# Patient Record
Sex: FEMALE | Race: BLACK OR AFRICAN AMERICAN | Employment: FULL TIME | URBAN - METROPOLITAN AREA
[De-identification: names, ages, dates, MRNs, and addresses within clinical notes are randomized per-mention and may not be internally consistent; named-entity substitution may affect disease eponyms.]

---

## 2020-11-13 ENCOUNTER — HOSPITAL ENCOUNTER (EMERGENCY)
Age: 25
Discharge: HOME | End: 2020-11-13
Attending: EMERGENCY MEDICINE

## 2020-11-13 ENCOUNTER — APPOINTMENT (OUTPATIENT)
Dept: GENERAL RADIOLOGY | Age: 25
End: 2020-11-13
Attending: EMERGENCY MEDICINE

## 2020-11-13 VITALS
SYSTOLIC BLOOD PRESSURE: 130 MMHG | RESPIRATION RATE: 18 BRPM | WEIGHT: 293 LBS | BODY MASS INDEX: 53.92 KG/M2 | TEMPERATURE: 98.1 F | HEART RATE: 70 BPM | HEIGHT: 62 IN | DIASTOLIC BLOOD PRESSURE: 60 MMHG | OXYGEN SATURATION: 100 %

## 2020-11-13 DIAGNOSIS — S83.8X1A ACUTE LATERAL MENISCAL INJURY OF RIGHT KNEE, INITIAL ENCOUNTER: Primary | ICD-10-CM

## 2020-11-13 PROCEDURE — 73562 X-RAY EXAM OF KNEE 3: CPT

## 2020-11-13 PROCEDURE — 99283 EMERGENCY DEPT VISIT LOW MDM: CPT

## 2020-11-14 NOTE — DISCHARGE INSTRUCTIONS
Patient Education      Tylenol and Motrin for pain. You may alternate them every 3-4 hours for best results. Knee for 20 minutes every 4 hours while awake for 3 to 5 days. Call [facility] Monday morning for follow-up and recheck later this week. Return if warmth or fever. Learning About RICE (Rest, Ice, Compression, and Elevation)  What is RICE? RICE is a way to care for an injury. RICE helps relieve pain and swelling. It may also help with healing and flexibility. RICE stands for:  · R est and protect the injured or sore area. · I ce or a cold pack used as soon as possible. · C ompression, or wrapping the injured or sore area with an elastic bandage. · E levation (propping up) the injured or sore area. How do you do RICE? You can use RICE for home treatment when you have general aches and pains or after an injury or surgery. Rest  · Do not put weight on the injury for at least 24 to 48 hours. · Use crutches for a badly sprained knee or ankle. · Support a sprained wrist, elbow, or shoulder with a sling. Ice  · Put ice or a cold pack on the injury right away to reduce pain and swelling. Frozen vegetables will also work as an ice pack. Put a thin cloth between the ice or cold pack and your skin. The cloth protects the injured area from getting too cold. · Use ice for 10 to 15 minutes at a time for the first 48 to 72 hours. Compression  · Use compression for sprains, strains, and surgeries of the arms and legs. · Wrap the injured area with an elastic bandage or compression sleeve to reduce swelling. · Don't wrap it too tightly. If the area below it feels numb, tingles, or feels cool, loosen the wrap. Elevation  · Use elevation for areas of the body that can be propped up, such as arms and legs. · Prop up the injured area on pillows whenever you use ice. Keep it propped up anytime you sit or lie down. · Try to keep the injured area at or above the level of your heart.  This will help reduce swelling and bruising. Where can you learn more? Go to http://www.gray.com/  Enter I463 in the search box to learn more about \"Learning About RICE (Rest, Ice, Compression, and Elevation). \"  Current as of: March 2, 2020               Content Version: 12.6  © 8428-1587 [employer]. Care instructions adapted under license by [employer] (which disclaims liability or warranty for this information). If you have questions about a medical condition or this instruction, always ask your healthcare professional. [address] any warranty or liability for your use of this information.

## 2020-11-14 NOTE — ED NOTES
I have reviewed discharge instructions with the patient. The patient verbalized understanding. Patient left ED via Discharge Method: ambulatory to Home with self. Opportunity for questions and clarification provided. Patient given 0 scripts. The pt was in no acute distress at [place]. To continue your aftercare when you leave the hospital, you may receive an automated call from our care team to check in on how you are doing. This is a free service and part of our promise to provide the best care and service to meet your aftercare needs.  If you have questions, or wish to unsubscribe from this service please call [phone]. Thank you for Choosing our [address] [place] [address] Emergency Department.

## 2020-11-14 NOTE — ED TRIAGE NOTES
Pt states that she is having right knee pain. She states that it is difficult to walk on it. This has been going on for \"awhile. \"     Pt masked in triage.

## 2021-01-25 ENCOUNTER — HOSPITAL ENCOUNTER (EMERGENCY)
Age: 26
Discharge: HOME | End: 2021-01-25
Attending: EMERGENCY MEDICINE

## 2021-01-25 VITALS
RESPIRATION RATE: 20 BRPM | WEIGHT: 293 LBS | TEMPERATURE: 98 F | DIASTOLIC BLOOD PRESSURE: 68 MMHG | OXYGEN SATURATION: 100 % | SYSTOLIC BLOOD PRESSURE: 108 MMHG | HEART RATE: 68 BPM | HEIGHT: 64 IN | BODY MASS INDEX: 50.02 KG/M2

## 2021-01-25 DIAGNOSIS — D64.9 ANEMIA, UNSPECIFIED TYPE: ICD-10-CM

## 2021-01-25 DIAGNOSIS — R42 DIZZY: Primary | ICD-10-CM

## 2021-01-25 LAB
ALBUMIN SERPL-MCNC: 3.5 G/DL (ref 3.5–5)
ALBUMIN/GLOB SERPL: 0.8 {RATIO} (ref 1.2–3.5)
ALP SERPL-CCNC: 99 U/L (ref 50–136)
ALT SERPL-CCNC: 19 U/L (ref 12–65)
ANION GAP SERPL CALC-SCNC: 3 MMOL/L (ref 7–16)
AST SERPL-CCNC: 12 U/L (ref 15–37)
ATRIAL RATE: 49 BPM
BASOPHILS # BLD: 0 K/UL (ref 0–0.2)
BASOPHILS NFR BLD: 1 % (ref 0–2)
BILIRUB SERPL-MCNC: 0.3 MG/DL (ref 0.2–1.1)
BUN SERPL-MCNC: 9 MG/DL (ref 6–23)
CALCIUM SERPL-MCNC: 8.9 MG/DL (ref 8.3–10.4)
CALCULATED P AXIS, ECG09: 62 DEGREES
CALCULATED R AXIS, ECG10: 72 DEGREES
CALCULATED T AXIS, ECG11: 41 DEGREES
CHLORIDE SERPL-SCNC: 108 MMOL/L (ref 98–107)
CO2 SERPL-SCNC: 28 MMOL/L (ref 21–32)
CREAT SERPL-MCNC: 0.76 MG/DL (ref 0.6–1)
DIAGNOSIS, 93000: NORMAL
DIFFERENTIAL METHOD BLD: ABNORMAL
EOSINOPHIL # BLD: 0.1 K/UL (ref 0–0.8)
EOSINOPHIL NFR BLD: 2 % (ref 0.5–7.8)
ERYTHROCYTE [DISTWIDTH] IN BLOOD BY AUTOMATED COUNT: 20.4 % (ref 11.9–14.6)
GLOBULIN SER CALC-MCNC: 4.5 G/DL (ref 2.3–3.5)
GLUCOSE SERPL-MCNC: 86 MG/DL (ref 65–100)
HCG UR QL: NEGATIVE
HCT VFR BLD AUTO: 33.2 % (ref 35.8–46.3)
HGB BLD-MCNC: 9.1 G/DL (ref 11.7–15.4)
IMM GRANULOCYTES # BLD AUTO: 0 K/UL (ref 0–0.5)
IMM GRANULOCYTES NFR BLD AUTO: 0 % (ref 0–5)
LYMPHOCYTES # BLD: 2.3 K/UL (ref 0.5–4.6)
LYMPHOCYTES NFR BLD: 42 % (ref 13–44)
MCH RBC QN AUTO: 16.5 PG (ref 26.1–32.9)
MCHC RBC AUTO-ENTMCNC: 27.4 G/DL (ref 31.4–35)
MCV RBC AUTO: 60.3 FL (ref 79.6–97.8)
MONOCYTES # BLD: 0.5 K/UL (ref 0.1–1.3)
MONOCYTES NFR BLD: 9 % (ref 4–12)
NEUTS SEG # BLD: 2.5 K/UL (ref 1.7–8.2)
NEUTS SEG NFR BLD: 46 % (ref 43–78)
NRBC # BLD: 0 K/UL (ref 0–0.2)
P-R INTERVAL, ECG05: 170 MS
PLATELET # BLD AUTO: 465 K/UL (ref 150–450)
PMV BLD AUTO: 9.6 FL (ref 9.4–12.3)
POTASSIUM SERPL-SCNC: 4.1 MMOL/L (ref 3.5–5.1)
PROT SERPL-MCNC: 8 G/DL (ref 6.3–8.2)
Q-T INTERVAL, ECG07: 436 MS
QRS DURATION, ECG06: 98 MS
QTC CALCULATION (BEZET), ECG08: 393 MS
RBC # BLD AUTO: 5.51 M/UL (ref 4.05–5.2)
SODIUM SERPL-SCNC: 139 MMOL/L (ref 136–145)
VENTRICULAR RATE, ECG03: 49 BPM
WBC # BLD AUTO: 5.5 K/UL (ref 4.3–11.1)

## 2021-01-25 PROCEDURE — 81003 URINALYSIS AUTO W/O SCOPE: CPT

## 2021-01-25 PROCEDURE — 93005 ELECTROCARDIOGRAM TRACING: CPT | Performed by: PHYSICIAN ASSISTANT

## 2021-01-25 PROCEDURE — 85025 COMPLETE CBC W/AUTO DIFF WBC: CPT

## 2021-01-25 PROCEDURE — 80053 COMPREHEN METABOLIC PANEL: CPT

## 2021-01-25 PROCEDURE — 81025 URINE PREGNANCY TEST: CPT

## 2021-01-25 PROCEDURE — 99285 EMERGENCY DEPT VISIT HI MDM: CPT

## 2021-01-25 NOTE — LETTER
[id number] [address] EMERGENCY DEPT 
[address] [facility] [id number] [phone] Work/School Note Date: 1/25/2021 To Whom It May concern: Orly Chatterjee was seen and treated today in the emergency room by the following provider(s): 
Attending Provider: Sánchez Phillip MD 
Physician Assistant: AUGUSTA Juarez. Orly Chatterjee is excused from work/school on 01/25/21. She is clear to return to work/school on 01/26/21. Sincerely, AUGUSTA Alcantara

## 2021-01-25 NOTE — DISCHARGE INSTRUCTIONS
Use over the counter daily multivitamin with iron, call office to arrange follow up appt, return to er if symptoms worsen

## 2021-01-25 NOTE — ED NOTES
I have reviewed discharge instructions with the patient. The patient verbalized understanding. Patient left ED via Discharge Method: ambulatory to Home with (self). Opportunity for questions and clarification provided. Patient given 0 scripts. No esign         To continue your aftercare when you leave the hospital, you may receive an automated call from our care team to check in on how you are doing. This is a free service and part of our promise to provide the best care and service to meet your aftercare needs.  If you have questions, or wish to unsubscribe from this service please call [phone]. Thank you for Choosing our [facility] Emergency Department.

## 2021-01-25 NOTE — ED TRIAGE NOTES
Pt states dizziness since yesterday. Denies N/V/D. States she did have some ETOH on Sat. The day before she got dizzy. States she was at work today and got dizzy again and could not stay. Pt has a mask for triage.

## 2021-02-26 ENCOUNTER — HOSPITAL ENCOUNTER (EMERGENCY)
Age: 26
Discharge: HOME | End: 2021-02-26
Attending: STUDENT IN AN ORGANIZED HEALTH CARE EDUCATION/TRAINING PROGRAM

## 2021-02-26 VITALS
RESPIRATION RATE: 16 BRPM | TEMPERATURE: 99.2 F | WEIGHT: 293 LBS | DIASTOLIC BLOOD PRESSURE: 70 MMHG | HEART RATE: 76 BPM | BODY MASS INDEX: 53.92 KG/M2 | SYSTOLIC BLOOD PRESSURE: 105 MMHG | HEIGHT: 62 IN | OXYGEN SATURATION: 100 %

## 2021-02-26 DIAGNOSIS — B37.31 VAGINAL CANDIDA: Primary | ICD-10-CM

## 2021-02-26 DIAGNOSIS — B96.89 BV (BACTERIAL VAGINOSIS): ICD-10-CM

## 2021-02-26 DIAGNOSIS — N76.0 BV (BACTERIAL VAGINOSIS): ICD-10-CM

## 2021-02-26 LAB
SERVICE CMNT-IMP: NORMAL
WET PREP GENITAL: NORMAL

## 2021-02-26 PROCEDURE — 87491 CHLMYD TRACH DNA AMP PROBE: CPT

## 2021-02-26 PROCEDURE — 87210 SMEAR WET MOUNT SALINE/INK: CPT

## 2021-02-26 PROCEDURE — 99283 EMERGENCY DEPT VISIT LOW MDM: CPT

## 2021-02-26 PROCEDURE — 81003 URINALYSIS AUTO W/O SCOPE: CPT

## 2021-02-26 PROCEDURE — 74011250637 HC RX REV CODE- 250/637: Performed by: STUDENT IN AN ORGANIZED HEALTH CARE EDUCATION/TRAINING PROGRAM

## 2021-02-26 RX ORDER — METRONIDAZOLE 500 MG/1
500 TABLET ORAL 2 TIMES DAILY
Qty: 14 TAB | Refills: 0 | Status: SHIPPED | OUTPATIENT
Start: 2021-02-26 | End: 2021-03-05

## 2021-02-26 RX ORDER — FLUCONAZOLE 100 MG/1
200 TABLET ORAL
Status: COMPLETED | OUTPATIENT
Start: 2021-02-26 | End: 2021-02-26

## 2021-02-26 RX ADMIN — FLUCONAZOLE 200 MG: 100 TABLET ORAL at 21:47

## 2021-02-26 NOTE — ED TRIAGE NOTES
Patient advises that she believes that she has a yeast infection after changing laundry detergents. States white discharge and severe pain when she wipes area. Mask on during triage.

## 2021-02-27 NOTE — DISCHARGE INSTRUCTIONS
Take the antibiotic prescribed as directed and to run out of medication. Avoid tight fitting clothing and change undergarments regularly.

## 2021-02-27 NOTE — ED PROVIDER NOTES
25-year-old female patient presents with reports of vaginal irritation and discharge yesterday. Patient states she recently changed detergents and feels this may have caused her issue. She reports similar symptoms in the past with yeast infection stating that her current symptoms feel similar. She reports white discharge and external vaginal pain though no rash exists. She denies any known STD exposure but is currently sexually active. Last menstrual cycle was completed proximally 1 week ago. Patient denies fever, chills or pain elsewhere. The history is provided by the patient. No  was used. Vaginal Discharge   Pertinent negatives include no diaphoresis, no fever, no abdominal pain, no diarrhea, no nausea, no vomiting and no dysuria. History reviewed. No pertinent past medical history. History reviewed. No pertinent surgical history. History reviewed. No pertinent family history.     Social History     Socioeconomic History    Marital status:      Spouse name: Not on file    Number of children: Not on file    Years of education: Not on file    Highest education level: Not on file   Occupational History    Not on file   Social Needs    Financial resource strain: Not on file    Food insecurity     Worry: Not on file     Inability: Not on file    Transportation needs     Medical: Not on file     Non-medical: Not on file   Tobacco Use    Smoking status: Never Smoker    Smokeless tobacco: Never Used   Substance and Sexual Activity    Alcohol use: Yes     Comment: socially    Drug use: Not Currently    Sexual activity: Not on file   Lifestyle    Physical activity     Days per week: Not on file     Minutes per session: Not on file    Stress: Not on file   Relationships    Social connections     Talks on phone: Not on file     Gets together: Not on file     Attends Jew service: Not on file     Active member of club or organization: Not on file Attends meetings of clubs or organizations: Not on file     Relationship status: Not on file    Intimate partner violence     Fear of current or ex partner: Not on file     Emotionally abused: Not on file     Physically abused: Not on file     Forced sexual activity: Not on file   Other Topics Concern    Not on file   Social History Narrative    Not on file         ALLERGIES: Patient has no known allergies. Review of Systems   Constitutional: Negative for chills, diaphoresis and fever. HENT: Negative for congestion, sneezing and sore throat. Eyes: Negative for visual disturbance. Respiratory: Negative for cough, chest tightness, shortness of breath and wheezing. Cardiovascular: Negative for chest pain and leg swelling. Gastrointestinal: Negative for abdominal pain, blood in stool, diarrhea, nausea and vomiting. Endocrine: Negative for polyuria. Genitourinary: Positive for vaginal discharge and vaginal pain. Negative for difficulty urinating, dysuria, flank pain, hematuria and urgency. Musculoskeletal: Negative for back pain, myalgias, neck pain and neck stiffness. Skin: Negative for color change and rash. Neurological: Negative for dizziness, syncope, speech difficulty, weakness, light-headedness, numbness and headaches. Psychiatric/Behavioral: Negative for behavioral problems. All other systems reviewed and are negative. Vitals:    02/26/21 1815   BP: 105/70   Pulse: 76   Resp: 16   Temp: 99.2 °F (37.3 °C)   SpO2: 100%   Weight: 132.9 kg (293 lb)   Height: 5' 2\" (1.575 m)            Physical Exam  Vitals signs and nursing note reviewed. Constitutional:       General: She is not in acute distress. Appearance: She is well-developed. She is not diaphoretic. Comments: Alert and oriented to person place and time. No acute distress, speaks in clear, fluid sentences. HENT:      Head: Normocephalic and atraumatic.       Right Ear: External ear normal.      Left Ear: External ear normal.      Nose: Nose normal.   Eyes:      Pupils: Pupils are equal, round, and reactive to light. Neck:      Musculoskeletal: Normal range of motion. Cardiovascular:      Rate and Rhythm: Normal rate and regular rhythm. Heart sounds: Normal heart sounds. No murmur. No friction rub. No gallop. Pulmonary:      Effort: Pulmonary effort is normal. No respiratory distress. Breath sounds: Normal breath sounds. No stridor. No decreased breath sounds, wheezing, rhonchi or rales. Chest:      Chest wall: No tenderness. Abdominal:      General: There is no distension. Palpations: Abdomen is soft. There is no mass. Tenderness: There is no abdominal tenderness. There is no guarding or rebound. Hernia: No hernia is present. Musculoskeletal: Normal range of motion. General: No tenderness or deformity. Skin:     General: Skin is warm and dry. Neurological:      Mental Status: She is alert and oriented to person, place, and time. Cranial Nerves: No cranial nerve deficit. MDM  Number of Diagnoses or Management Options  BV (bacterial vaginosis): new and does not require workup  Vaginal candida: new and does not require workup  Diagnosis management comments: Pelvic examination and culture collection, patient wishes to self swab. Swabs reveal evidence of yeast and clue cells. Discussed these findings with patient who voices understanding and agreement. Patient received Diflucan in this department will be discharged with Flagyl. Voice dictation software was used during the making of this note. This software is not perfect and grammatical and other typographical errors may be present. This note has been proofread, but may still contain errors.   [address] Doctor Scotty De La Rosa [facility] DO; 2/27/2021 @12:49 AM   ===================================================================         Amount and/or Complexity of Data Reviewed  Clinical lab tests: ordered and reviewed  Tests in the medicine section of CPT®: ordered and reviewed    Risk of Complications, Morbidity, and/or Mortality  Presenting problems: moderate  Diagnostic procedures: low  Management options: moderate    Patient Progress  Patient progress: stable         Procedures

## 2021-03-01 LAB
C TRACH RRNA SPEC QL NAA+PROBE: NEGATIVE
N GONORRHOEA RRNA SPEC QL NAA+PROBE: NEGATIVE
PLEASE NOTE:, 188601: NORMAL
SPECIMEN SOURCE: NORMAL

## 2021-03-31 NOTE — ED PROVIDER NOTES
03/31/21 0741   Vital Signs   Temp 97 °F (36.1 °C)   Temp Source Oral   Pulse 64   Heart Rate Source Monitor   Resp 18   BP (!) 143/71   BP Location Left upper arm   Patient Position Semi fowlers   Level of Consciousness Alert (0)   MEWS Score 1   Oxygen Therapy   SpO2 97 %   O2 Device None (Room air)     Vitals as above this am. Patient in stable condition, however she states she is having nausea related to her antibiotics. Patient medicated with zofran at this time per prn order. Patient did not take her PO medications this am due to nausea, she wished to hold off until nausea improves. Patient has call light within reach and bed alarm on. Patient complains of off-and-on dizziness that started yesterday. She states she does not feel dizzy while she sitting only when she is walking or stands. No chest pain or shortness of breath she did try to go to work this morning felt an episode but was able to leave work and drive herself here patient denies any history of diabetes or thyroid illness no headache no visual changes. She states she had some alcohol on Saturday but only a small amount does not feel that this contributes to her symptoms    The history is provided by the patient. Dizziness  This is a new problem. The current episode started yesterday. The problem has not changed since onset. There was no focality noted. Pertinent negatives include no focal weakness, no loss of sensation, no loss of balance, no slurred speech, no speech difficulty, no memory loss, no movement disorder, no agitation, no visual change, no auditory change, no mental status change, no unresponsiveness and no disorientation. There has been no fever. Pertinent negatives include no shortness of breath, no chest pain, no vomiting, no altered mental status, no confusion, no headaches, no choking, no nausea, no bowel incontinence and no bladder incontinence. History reviewed. No pertinent past medical history. History reviewed. No pertinent surgical history. History reviewed. No pertinent family history.     Social History     Socioeconomic History    Marital status:      Spouse name: Not on file    Number of children: Not on file    Years of education: Not on file    Highest education level: Not on file   Occupational History    Not on file   Social Needs    Financial resource strain: Not on file    Food insecurity     Worry: Not on file     Inability: Not on file    Transportation needs     Medical: Not on file     Non-medical: Not on file   Tobacco Use    Smoking status: Never Smoker    Smokeless tobacco: Never Used   Substance and Sexual Activity    Alcohol use: Yes     Comment: socially    Drug use: Not Currently    Sexual activity: Not on file   Lifestyle    Physical activity     Days per week: Not on file     Minutes per session: Not on file    Stress: Not on file   Relationships    Social connections     Talks on phone: Not on file     Gets together: Not on file     Attends Taoism service: Not on file     Active member of club or organization: Not on file     Attends meetings of clubs or organizations: Not on file     Relationship status: Not on file    Intimate partner violence     Fear of current or ex partner: Not on file     Emotionally abused: Not on file     Physically abused: Not on file     Forced sexual activity: Not on file   Other Topics Concern    Not on file   Social History Narrative    Not on file         ALLERGIES: Patient has no known allergies. Review of Systems   Respiratory: Negative for choking and shortness of breath. Cardiovascular: Negative for chest pain. Gastrointestinal: Negative for bowel incontinence, nausea and vomiting. Genitourinary: Negative for bladder incontinence. Neurological: Positive for dizziness. Negative for focal weakness, speech difficulty, headaches and loss of balance. Psychiatric/Behavioral: Negative for agitation, confusion and memory loss. All other systems reviewed and are negative. Vitals:    01/25/21 0852   BP: 120/66   Pulse: (!) 58   Resp: 16   Temp: 98 °F (36.7 °C)   SpO2: 100%   Weight: 151.5 kg (334 lb)   Height: 5' 4\" (1.626 m)            Physical Exam  Vitals signs and nursing note reviewed. Constitutional:       General: She is not in acute distress. Appearance: Normal appearance. She is well-developed. She is obese. She is not diaphoretic. HENT:      Head: Normocephalic and atraumatic. Eyes:      Extraocular Movements: Extraocular movements intact. Pupils: Pupils are equal, round, and reactive to light.    Neck:      Musculoskeletal: Normal range of motion and neck supple. No muscular tenderness. Cardiovascular:      Rate and Rhythm: Normal rate and regular rhythm. Pulmonary:      Effort: Pulmonary effort is normal.      Breath sounds: Normal breath sounds. No wheezing or rhonchi. Abdominal:      General: Bowel sounds are normal.      Palpations: Abdomen is soft. Musculoskeletal: Normal range of motion. Skin:     General: Skin is warm. Neurological:      General: No focal deficit present. Mental Status: She is alert and oriented to person, place, and time.    Psychiatric:         Mood and Affect: Mood normal.         Behavior: Behavior normal.          MDM  Number of Diagnoses or Management Options  Anemia, unspecified type  Dizzy  Diagnosis management comments: Cbc, cmp, normal, ekg sinus elida 49, pt not orthostatic, urine hcg and dip -  On review of previous chart heart rate in 50's, hcg 9.2, was 9.5 2 years ago  Stressed need for pmd,no further er work up work note given        Amount and/or Complexity of Data Reviewed  Clinical lab tests: ordered and reviewed  Review and summarize past medical records: yes  Discuss the patient with other providers: yes  Independent visualization of images, tracings, or specimens: yes    Risk of Complications, Morbidity, and/or Mortality  Presenting problems: moderate  Diagnostic procedures: low  Management options: low    Patient Progress  Patient progress: improved         Procedures

## 2021-05-20 ENCOUNTER — APPOINTMENT (OUTPATIENT)
Dept: ULTRASOUND IMAGING | Age: 26
End: 2021-05-20
Attending: PHYSICIAN ASSISTANT

## 2021-05-20 ENCOUNTER — HOSPITAL ENCOUNTER (EMERGENCY)
Age: 26
Discharge: HOME | End: 2021-05-20
Attending: EMERGENCY MEDICINE

## 2021-05-20 VITALS
SYSTOLIC BLOOD PRESSURE: 151 MMHG | BODY MASS INDEX: 54.14 KG/M2 | DIASTOLIC BLOOD PRESSURE: 79 MMHG | OXYGEN SATURATION: 100 % | WEIGHT: 293 LBS | HEART RATE: 67 BPM | RESPIRATION RATE: 16 BRPM | TEMPERATURE: 98.1 F

## 2021-05-20 DIAGNOSIS — R10.11 ABDOMINAL PAIN, RIGHT UPPER QUADRANT: Primary | ICD-10-CM

## 2021-05-20 LAB
ALBUMIN SERPL-MCNC: 3.4 G/DL (ref 3.5–5)
ALBUMIN/GLOB SERPL: 0.7 {RATIO} (ref 1.2–3.5)
ALP SERPL-CCNC: 89 U/L (ref 50–130)
ALT SERPL-CCNC: 21 U/L (ref 12–65)
ANION GAP SERPL CALC-SCNC: 4 MMOL/L (ref 7–16)
AST SERPL-CCNC: 16 U/L (ref 15–37)
BASOPHILS # BLD: 0 K/UL (ref 0–0.2)
BASOPHILS NFR BLD: 1 % (ref 0–2)
BILIRUB SERPL-MCNC: 0.3 MG/DL (ref 0.2–1.1)
BUN SERPL-MCNC: 15 MG/DL (ref 6–23)
CALCIUM SERPL-MCNC: 8.5 MG/DL (ref 8.3–10.4)
CHLORIDE SERPL-SCNC: 109 MMOL/L (ref 98–107)
CO2 SERPL-SCNC: 26 MMOL/L (ref 21–32)
CREAT SERPL-MCNC: 0.72 MG/DL (ref 0.6–1)
DIFFERENTIAL METHOD BLD: ABNORMAL
EOSINOPHIL # BLD: 0.1 K/UL (ref 0–0.8)
EOSINOPHIL NFR BLD: 1 % (ref 0.5–7.8)
ERYTHROCYTE [DISTWIDTH] IN BLOOD BY AUTOMATED COUNT: 18.8 % (ref 11.9–14.6)
GLOBULIN SER CALC-MCNC: 4.6 G/DL (ref 2.3–3.5)
GLUCOSE SERPL-MCNC: 89 MG/DL (ref 65–100)
HCT VFR BLD AUTO: 27.8 % (ref 35.8–46.3)
HGB BLD-MCNC: 7.8 G/DL (ref 11.7–15.4)
IMM GRANULOCYTES # BLD AUTO: 0 K/UL (ref 0–0.5)
IMM GRANULOCYTES NFR BLD AUTO: 0 % (ref 0–5)
LIPASE SERPL-CCNC: 43 U/L (ref 73–393)
LYMPHOCYTES # BLD: 2 K/UL (ref 0.5–4.6)
LYMPHOCYTES NFR BLD: 32 % (ref 13–44)
MCH RBC QN AUTO: 16.4 PG (ref 26.1–32.9)
MCHC RBC AUTO-ENTMCNC: 28.1 G/DL (ref 31.4–35)
MCV RBC AUTO: 58.3 FL (ref 79.6–97.8)
MONOCYTES # BLD: 0.7 K/UL (ref 0.1–1.3)
MONOCYTES NFR BLD: 11 % (ref 4–12)
NEUTS SEG # BLD: 3.4 K/UL (ref 1.7–8.2)
NEUTS SEG NFR BLD: 54 % (ref 43–78)
NRBC # BLD: 0 K/UL (ref 0–0.2)
PLATELET # BLD AUTO: 424 K/UL (ref 150–450)
PMV BLD AUTO: 9.5 FL (ref 9.4–12.3)
POTASSIUM SERPL-SCNC: 4.1 MMOL/L (ref 3.5–5.1)
PROT SERPL-MCNC: 8 G/DL (ref 6.3–8.2)
RBC # BLD AUTO: 4.77 M/UL (ref 4.05–5.2)
SODIUM SERPL-SCNC: 139 MMOL/L (ref 136–145)
WBC # BLD AUTO: 6.3 K/UL (ref 4.3–11.1)

## 2021-05-20 PROCEDURE — 85025 COMPLETE CBC W/AUTO DIFF WBC: CPT

## 2021-05-20 PROCEDURE — 83690 ASSAY OF LIPASE: CPT

## 2021-05-20 PROCEDURE — 76700 US EXAM ABDOM COMPLETE: CPT

## 2021-05-20 PROCEDURE — 80053 COMPREHEN METABOLIC PANEL: CPT

## 2021-05-20 PROCEDURE — 81003 URINALYSIS AUTO W/O SCOPE: CPT

## 2021-05-20 PROCEDURE — 99283 EMERGENCY DEPT VISIT LOW MDM: CPT

## 2021-05-20 RX ORDER — SODIUM CHLORIDE 0.9 % (FLUSH) 0.9 %
5-10 SYRINGE (ML) INJECTION AS NEEDED
Status: DISCONTINUED | OUTPATIENT
Start: 2021-05-20 | End: 2021-05-20 | Stop reason: HOSPADM

## 2021-05-20 RX ORDER — HYOSCYAMINE SULFATE 0.125 MG
125 TABLET ORAL
Qty: 20 TABLET | Refills: 0 | Status: SHIPPED | OUTPATIENT
Start: 2021-05-20 | End: 2021-05-25

## 2021-05-20 RX ORDER — SODIUM CHLORIDE 0.9 % (FLUSH) 0.9 %
5-10 SYRINGE (ML) INJECTION EVERY 8 HOURS
Status: DISCONTINUED | OUTPATIENT
Start: 2021-05-20 | End: 2021-05-20 | Stop reason: HOSPADM

## 2021-05-20 NOTE — ED NOTES
I have reviewed discharge instructions with the patient. The patient verbalized understanding. Patient left ED via Discharge Method: ambulatory to Home with self. Opportunity for questions and clarification provided. Patient given 1 scripts. To continue your aftercare when you leave the hospital, you may receive an automated call from our care team to check in on how you are doing. This is a free service and part of our promise to provide the best care and service to meet your aftercare needs.  If you have questions, or wish to unsubscribe from this service please call [phone]. Thank you for Choosing our [facility] Emergency Department.

## 2021-05-20 NOTE — ED PROVIDER NOTES
Patient to ER complaining of right upper abdominal pain for the last 5 days. This started after a grilled chicken meal.  She denies any vomiting but + nausea, no diarrhea she is to use over-the-counter anti-inflammatories with minimal relief. She had a similar episode 2 weeks ago that went away after a few days. Have normal menstrual cycle last week. She denies any fever no cough no chest pain or shortness of breath. She is not concerned for any possible STDs no urinary symptoms    The history is provided by the patient. Abdominal Pain   This is a new problem. The current episode started more than 2 days ago. The problem occurs constantly. The problem has not changed since onset. The pain is associated with an unknown factor. The pain is located in the RUQ. The quality of the pain is aching. The pain is at a severity of 7/10. The pain is moderate. Associated symptoms include nausea. Pertinent negatives include no diarrhea, no vomiting, no constipation, no dysuria and no frequency. Nothing worsens the pain. The pain is relieved by nothing. Her past medical history does not include gallstones, pancreatitis or diverticulitis. History reviewed. No pertinent past medical history. History reviewed. No pertinent surgical history. History reviewed. No pertinent family history.     Social History     Socioeconomic History    Marital status:      Spouse name: Not on file    Number of children: Not on file    Years of education: Not on file    Highest education level: Not on file   Occupational History    Not on file   Tobacco Use    Smoking status: Never Smoker    Smokeless tobacco: Never Used   Substance and Sexual Activity    Alcohol use: Yes     Comment: socially    Drug use: Not Currently    Sexual activity: Not on file   Other Topics Concern    Not on file   Social History Narrative    Not on file     Social Determinants of Health     Financial Resource Strain:     Difficulty of Paying Living Expenses:    Food Insecurity:     Worried About Running Out of Food in the Last Year:     [address] in the Last Year:    Transportation Needs:     Lack of Transportation (Medical):  Lack of Transportation (Non-Medical):    Physical Activity:     Days of Exercise per Week:     Minutes of Exercise per Session:    Stress:     Feeling of Stress :    Social Connections:     Frequency of Communication with Friends and Family:     Frequency of Social Gatherings with Friends and Family:     Attends [employer] Services:     Active Member of Clubs or Organizations:     Attends Club or Organization Meetings:     Marital Status:    Intimate Partner Violence:     Fear of Current or Ex-Partner:     Emotionally Abused:     Physically Abused:     Sexually Abused: ALLERGIES: Patient has no known allergies. Review of Systems   Gastrointestinal: Positive for abdominal pain and nausea. Negative for constipation, diarrhea and vomiting. Genitourinary: Negative for dysuria and frequency. All other systems reviewed and are negative. Vitals:    05/20/21 0938   BP: (!) 151/79   Pulse: 67   Resp: 16   Temp: 98.1 °F (36.7 °C)   SpO2: 100%   Weight: 134.3 kg (296 lb)            Physical Exam  Vitals and nursing note reviewed. Constitutional:       General: She is not in acute distress. Appearance: She is well-developed. She is obese. She is not diaphoretic. HENT:      Head: Normocephalic and atraumatic. Eyes:      Extraocular Movements: Extraocular movements intact. Pupils: Pupils are equal, round, and reactive to light. Cardiovascular:      Rate and Rhythm: Normal rate and regular rhythm. Pulmonary:      Effort: Pulmonary effort is normal.      Breath sounds: Normal breath sounds. Abdominal:      General: Abdomen is flat. Bowel sounds are normal. There is no distension. Palpations: Abdomen is soft. Tenderness:  There is abdominal tenderness in the right upper quadrant and right lower quadrant. There is no guarding or rebound. Hernia: No hernia is present. Musculoskeletal:         General: Normal range of motion. Cervical back: Normal range of motion and neck supple. Skin:     General: Skin is warm. Neurological:      General: No focal deficit present. Mental Status: She is alert and oriented to person, place, and time. Psychiatric:         Mood and Affect: Mood normal.         Behavior: Behavior normal.          MDM  Number of Diagnoses or Management Options  Diagnosis management comments: Labs Reviewed  CBC WITH AUTOMATED DIFF - Abnormal; Notable for the following components:     HGB                           7.8 (*)                HCT                           27.8 (*)               MCV                           58.3 (*)               MCH                           16.4 (*)               MCHC                          28.1 (*)               RDW                           18.8 (*)            All other components within normal limits  METABOLIC PANEL, COMPREHENSIVE - Abnormal; Notable for the following components:     Chloride                      109 (*)                Anion gap                     4 (*)                  Albumin                       3.4 (*)                Globulin                      4.6 (*)                A-G Ratio                     0.7 (*)             All other components within normal limits  LIPASE - Abnormal; Notable for the following components:     Lipase                        43 (*)              All other components within normal limits    US ABD COMP   Final Result    Unremarkable examination.           We will treat abdominal pain with Levsin and follow-up with primary care patient advised on low-fat diet       Amount and/or Complexity of Data Reviewed  Clinical lab tests: ordered and reviewed  Tests in the radiology section of CPT®: ordered and reviewed  Review and summarize past medical records: yes    Risk of Complications, Morbidity, and/or Mortality  Presenting problems: moderate  Diagnostic procedures: moderate  Management options: low    Patient Progress  Patient progress: improved         Procedures

## 2021-05-20 NOTE — LETTER
[address] [address] EMERGENCY DEPT 
[address] [facility] [address] [phone] Work/School Note Date: 5/20/2021 To Whom It May concern: Stu Caldwell was seen and treated today in the emergency room by the following provider(s): 
Attending Provider: Maris Syed DO Physician Assistant: AUGUSTA Vallejo. Stu Caldwell is excused from work/school on 05/20/21. She is clear to return to work/school on 05/21/21. Sincerely, Burma Gosselin, PA

## 2021-05-20 NOTE — PROGRESS NOTES
Visited with patient as no PCP listed in chart. Demographics on face sheet verified. Patient states no PCP and no insurance. Explained the Access [facility] program, [facility], [facility] and [employer] in detail and provided patient with resources. Contact information for [facility] given to patient and patient encouraged to follow up with her as soon as possible. Patient also given direct contact information for the [facility] representative at [facility] and encouraged to call to get screened for financial assistance.

## 2021-05-20 NOTE — DISCHARGE INSTRUCTIONS
Use meds as directed for cramping avoid high fat foods call office to arrange routine follow-up appointment

## 2021-09-19 ENCOUNTER — HOSPITAL ENCOUNTER (EMERGENCY)
Age: 26
Discharge: HOME | End: 2021-09-19
Attending: EMERGENCY MEDICINE
Payer: COMMERCIAL

## 2021-09-19 VITALS
DIASTOLIC BLOOD PRESSURE: 83 MMHG | HEART RATE: 73 BPM | TEMPERATURE: 97.8 F | OXYGEN SATURATION: 100 % | RESPIRATION RATE: 16 BRPM | SYSTOLIC BLOOD PRESSURE: 124 MMHG

## 2021-09-19 DIAGNOSIS — B37.31 VAGINAL CANDIDIASIS: Primary | ICD-10-CM

## 2021-09-19 PROCEDURE — 99282 EMERGENCY DEPT VISIT SF MDM: CPT

## 2021-09-19 RX ORDER — FLUCONAZOLE 150 MG/1
150 TABLET ORAL
Qty: 2 TABLET | Refills: 0 | Status: SHIPPED | OUTPATIENT
Start: 2021-09-19 | End: 2021-09-19

## 2021-09-19 NOTE — ED PROVIDER NOTES
2 days of vaginal discharge was recently on ABX amoxicillin for dental infection has common yeast infections. States she is having white cheese like discharge that is associated with itching           No past medical history on file. No past surgical history on file. No family history on file. Social History     Socioeconomic History    Marital status:      Spouse name: Not on file    Number of children: Not on file    Years of education: Not on file    Highest education level: Not on file   Occupational History    Not on file   Tobacco Use    Smoking status: Never Smoker    Smokeless tobacco: Never Used   Substance and Sexual Activity    Alcohol use: Yes     Comment: socially    Drug use: Not Currently    Sexual activity: Not on file   Other Topics Concern    Not on file   Social History Narrative    Not on file     Social Determinants of Health     Financial Resource Strain:     Difficulty of Paying Living Expenses:    Food Insecurity:     Worried About Running Out of Food in the Last Year:     [address] in the Last Year:    Transportation Needs:     Lack of Transportation (Medical):  Lack of Transportation (Non-Medical):    Physical Activity:     Days of Exercise per Week:     Minutes of Exercise per Session:    Stress:     Feeling of Stress :    Social Connections:     Frequency of Communication with Friends and Family:     Frequency of Social Gatherings with Friends and Family:     Attends Jehovah's witness Services:     Active Member of Clubs or Organizations:     Attends Club or Organization Meetings:     Marital Status:    Intimate Partner Violence:     Fear of Current or Ex-Partner:     Emotionally Abused:     Physically Abused:     Sexually Abused: ALLERGIES: Patient has no known allergies. Review of Systems   Constitutional: Negative for chills and fever. HENT: Negative for facial swelling.     Respiratory: Negative for chest tightness and shortness of breath. Cardiovascular: Negative for chest pain. Gastrointestinal: Negative for abdominal pain, nausea and vomiting. Genitourinary: Positive for vaginal discharge. Musculoskeletal: Negative for myalgias. Neurological: Negative for headaches. Psychiatric/Behavioral: Negative for confusion. All other systems reviewed and are negative. There were no vitals filed for this visit. Physical Exam  Vitals and nursing note reviewed. Constitutional:       Appearance: Normal appearance. HENT:      Head: Normocephalic and atraumatic. Mouth/Throat:      Mouth: Mucous membranes are moist.   Eyes:      Pupils: Pupils are equal, round, and reactive to light. Cardiovascular:      Rate and Rhythm: Normal rate and regular rhythm. Pulmonary:      Effort: No respiratory distress. Breath sounds: Normal breath sounds. Abdominal:      Palpations: Abdomen is soft. Tenderness: There is no abdominal tenderness. There is no guarding. Genitourinary:     Comments: Exam deferred  Skin:     General: Skin is warm and dry. Neurological:      Mental Status: She is alert and oriented to person, place, and time. Mental status is at baseline. Psychiatric:         Mood and Affect: Mood normal.          MDM  Number of Diagnoses or Management Options  Vaginal candidiasis  Diagnosis management comments: 2 days of white vaginal discharge that is associated with itching. Recently finished amoxicillin for dental infection. Has frequent frequent yeast infections denies diabetes. No other medical complaints.     Risk of Complications, Morbidity, and/or Mortality  Presenting problems: minimal  Diagnostic procedures: minimal  Management options: minimal    Patient Progress  Patient progress: improved         Procedures

## 2021-09-19 NOTE — ED NOTES
I have reviewed discharge instructions with the patient. The patient verbalized understanding. Patient left ED via Discharge Method: ambulatory to Home with self. Opportunity for questions and clarification provided. Patient given 1 scripts. To continue your aftercare when you leave the hospital, you may receive an automated call from our care team to check in on how you are doing. This is a free service and part of our promise to provide the best care and service to meet your aftercare needs.  If you have questions, or wish to unsubscribe from this service please call [phone]. Thank you for Choosing our [facility] Emergency Department.

## 2021-09-19 NOTE — ED TRIAGE NOTES
Patient reports she was recently treated with antibiotics for a tooth infection. Pt reports she now has yeast infection.     Urbano Gannon RN

## 2021-11-21 ENCOUNTER — HOSPITAL ENCOUNTER (EMERGENCY)
Age: 26
Discharge: HOME | End: 2021-11-21
Attending: EMERGENCY MEDICINE

## 2021-11-21 VITALS
TEMPERATURE: 98.3 F | SYSTOLIC BLOOD PRESSURE: 102 MMHG | BODY MASS INDEX: 50.97 KG/M2 | HEART RATE: 69 BPM | WEIGHT: 277 LBS | OXYGEN SATURATION: 100 % | HEIGHT: 62 IN | DIASTOLIC BLOOD PRESSURE: 63 MMHG | RESPIRATION RATE: 16 BRPM

## 2021-11-21 DIAGNOSIS — N76.0 BV (BACTERIAL VAGINOSIS): Primary | ICD-10-CM

## 2021-11-21 DIAGNOSIS — B96.89 BV (BACTERIAL VAGINOSIS): Primary | ICD-10-CM

## 2021-11-21 LAB
HCG UR QL: NEGATIVE
SERVICE CMNT-IMP: NORMAL
WET PREP GENITAL: NORMAL

## 2021-11-21 PROCEDURE — 87491 CHLMYD TRACH DNA AMP PROBE: CPT

## 2021-11-21 PROCEDURE — 99284 EMERGENCY DEPT VISIT MOD MDM: CPT

## 2021-11-21 PROCEDURE — 81003 URINALYSIS AUTO W/O SCOPE: CPT

## 2021-11-21 PROCEDURE — 81025 URINE PREGNANCY TEST: CPT

## 2021-11-21 PROCEDURE — 87210 SMEAR WET MOUNT SALINE/INK: CPT

## 2021-11-21 RX ORDER — METRONIDAZOLE 500 MG/1
500 TABLET ORAL 2 TIMES DAILY
Qty: 14 TABLET | Refills: 0 | Status: SHIPPED | OUTPATIENT
Start: 2021-11-21 | End: 2021-11-28

## 2021-11-21 NOTE — ED TRIAGE NOTES
Pt states vaginal itching and irritation for about two days. States  mixed up clothing and has not used any new soap or detergents. Masked for triage.

## 2021-11-21 NOTE — ED PROVIDER NOTES
39-year-old female who presents to the emergency department today with chief complaint of yeast infection for 2 days. She reports vaginal itching, irritation, and white discharge. She denies any concern for STD. She denies any dysuria, pelvic pain, vaginal pain, or vaginal bleeding. The history is provided by the patient. Vaginal Itching  This is a new problem. The current episode started 2 days ago. The problem occurs constantly. The problem has not changed since onset. Pertinent negatives include no chest pain, no abdominal pain, no headaches and no shortness of breath. Nothing aggravates the symptoms. Nothing relieves the symptoms. She has tried nothing for the symptoms. No past medical history on file. No past surgical history on file. No family history on file. Social History     Socioeconomic History    Marital status:      Spouse name: Not on file    Number of children: Not on file    Years of education: Not on file    Highest education level: Not on file   Occupational History    Not on file   Tobacco Use    Smoking status: Never Smoker    Smokeless tobacco: Never Used   Substance and Sexual Activity    Alcohol use: Yes     Comment: socially    Drug use: Not Currently    Sexual activity: Not on file   Other Topics Concern    Not on file   Social History Narrative    Not on file     Social Determinants of Health     Financial Resource Strain:     Difficulty of Paying Living Expenses: Not on file   Food Insecurity:     Worried About Running Out of Food in the Last Year: Not on file    [place]yvi of Food in the Last Year: Not on file   Transportation Needs:     Lack of Transportation (Medical): Not on file    Lack of Transportation (Non-Medical):  Not on file   Physical Activity:     Days of Exercise per Week: Not on file    Minutes of Exercise per Session: Not on file   Stress:     Feeling of Stress : Not on file   Social Connections:     Frequency of Communication with Friends and Family: Not on file    Frequency of Social Gatherings with Friends and Family: Not on file    Attends Quaker Services: Not on file    Active Member of Clubs or Organizations: Not on file    Attends Club or Organization Meetings: Not on file    Marital Status: Not on file   Intimate Partner Violence:     Fear of Current or Ex-Partner: Not on file    Emotionally Abused: Not on file    Physically Abused: Not on file    Sexually Abused: Not on file   Housing Stability:     Unable to Pay for Housing in the Last Year: Not on file    Number of [place] in the Last Year: Not on file    Unstable Housing in the Last Year: Not on file         ALLERGIES: Patient has no known allergies. Review of Systems   Constitutional: Negative for fever. Respiratory: Negative for shortness of breath. Cardiovascular: Negative for chest pain. Gastrointestinal: Negative for abdominal pain. Genitourinary: Positive for vaginal discharge. Negative for dysuria. Neurological: Negative for headaches. All other systems reviewed and are negative. Vitals:    11/21/21 0946   BP: 102/63   Pulse: 69   Resp: 16   Temp: 98.3 °F (36.8 °C)   SpO2: 100%   Weight: 125.6 kg (277 lb)   Height: 5' 2\" (1.575 m)            Physical Exam  Vitals and nursing note reviewed. Constitutional:       General: She is not in acute distress. Appearance: Normal appearance. She is not ill-appearing, toxic-appearing or diaphoretic. HENT:      Head: Normocephalic and atraumatic. Right Ear: External ear normal.      Left Ear: External ear normal.      Mouth/Throat:      Mouth: Mucous membranes are moist.      Pharynx: Oropharynx is clear. Eyes:      General: No scleral icterus. Extraocular Movements: Extraocular movements intact. Conjunctiva/sclera: Conjunctivae normal.   Cardiovascular:      Rate and Rhythm: Normal rate.    Pulmonary:      Effort: Pulmonary effort is normal. No respiratory distress. Abdominal:      General: Abdomen is flat. There is no distension. Musculoskeletal:         General: Normal range of motion. Cervical back: Normal range of motion and neck supple. No rigidity. Right lower leg: No edema. Left lower leg: No edema. Skin:     General: Skin is warm and dry. Capillary Refill: Capillary refill takes less than 2 seconds. Neurological:      General: No focal deficit present. Mental Status: She is alert and oriented to person, place, and time. Psychiatric:         Mood and Affect: Mood normal.         Behavior: Behavior normal.         Thought Content: Thought content normal.         Judgment: Judgment normal.          MDM  Number of Diagnoses or Management Options  BV (bacterial vaginosis): new and requires workup  Diagnosis management comments: Well-appearing 79-year-old female presents emergency department today with complaint of vaginal itching and vaginal discharge for 2 days. Patient denies any concern for STD today but was agreeable to have gonorrhea and chlamydia swab sent. Patient denies any lesions, rashes, or blisters. She would prefer to do a self swab today. Wet prep positive for clue cells. Will treat for bacterial vaginosis with Flagyl. Urine is negative for pregnancy or indication of UTI.  I have discussed the results of all labs, procedures, radiographs, and/or treatments with the patient and available family members. Ilya Henderson is agreed upon by the patient and the patient is ready for discharge.  Questions about treatment in the ED and differential diagnosis of presenting condition were answered. Lalo Gillette was given verbal discharge instructions including, but not limited to, importance of returning to the emergency department for any concern of worsening or continued symptoms.  Instructions were given to follow up with a primary care provider or specialist within 1-2 days.  Adverse effects of medications, if prescribed, were discussed and patient was advised to refrain from significant physical activity until followed up by primary care physician and to not drive or operate heavy machinery after taking any sedating substances.      Tara Marcum, NP; 11/21/2021 @10:36 AM Voice dictation software was used during the making of  this note. This software is not perfect and grammatical and other typographical errors  may be present. This note has not been proofread for errors.          Amount and/or Complexity of Data Reviewed  Clinical lab tests: reviewed    Risk of Complications, Morbidity, and/or Mortality  Presenting problems: moderate  Diagnostic procedures: moderate  Management options: moderate    Patient Progress  Patient progress: improved         Procedures

## 2021-11-21 NOTE — ED NOTES
I have reviewed discharge instructions with the patient. The patient verbalized understanding. Patient left ED via Discharge Method: ambulatory to Home with self. Opportunity for questions and clarification provided. Patient given 1 scripts. To continue your aftercare when you leave the hospital, you may receive an automated call from our care team to check in on how you are doing. This is a free service and part of our promise to provide the best care and service to meet your aftercare needs.  If you have questions, or wish to unsubscribe from this service please call [phone]. Thank you for Choosing our [facility] Emergency Department.

## 2021-11-21 NOTE — DISCHARGE INSTRUCTIONS
Take medication as prescribed. Do not drink alcohol with this medication. Follow-up with your gynecologist.  Return to the emergency department for any new, worsening, or concerning symptoms.

## 2021-11-23 LAB
C TRACH RRNA SPEC QL NAA+PROBE: NEGATIVE
N GONORRHOEA RRNA SPEC QL NAA+PROBE: NEGATIVE
SPECIMEN SOURCE: NORMAL

## 2021-12-27 ENCOUNTER — HOSPITAL ENCOUNTER (EMERGENCY)
Age: 26
Discharge: HOME | End: 2021-12-27
Attending: EMERGENCY MEDICINE

## 2021-12-27 VITALS
SYSTOLIC BLOOD PRESSURE: 110 MMHG | HEART RATE: 99 BPM | DIASTOLIC BLOOD PRESSURE: 70 MMHG | WEIGHT: 276.9 LBS | OXYGEN SATURATION: 96 % | HEIGHT: 62 IN | BODY MASS INDEX: 50.96 KG/M2 | TEMPERATURE: 98.7 F | RESPIRATION RATE: 18 BRPM

## 2021-12-27 DIAGNOSIS — B34.9 VIRAL SYNDROME: Primary | ICD-10-CM

## 2021-12-27 LAB
COVID-19 RAPID TEST, COVR: NOT DETECTED
SOURCE, COVRS: NORMAL

## 2021-12-27 PROCEDURE — 99283 EMERGENCY DEPT VISIT LOW MDM: CPT

## 2021-12-27 PROCEDURE — 87635 SARS-COV-2 COVID-19 AMP PRB: CPT

## 2021-12-27 RX ORDER — BENZONATATE 100 MG/1
200 CAPSULE ORAL
Qty: 30 CAPSULE | Refills: 1 | Status: SHIPPED | OUTPATIENT
Start: 2021-12-27 | End: 2022-01-03

## 2021-12-27 RX ORDER — DEXAMETHASONE 6 MG/1
TABLET ORAL
Qty: 3 TABLET | Refills: 0 | Status: SHIPPED | OUTPATIENT
Start: 2021-12-27

## 2021-12-27 NOTE — ED TRIAGE NOTES
Pt states that she has had a cough, sore throat, and headache (sinus pressure). States that she is coughing up  Yellow mucous.   Masked on arrival

## 2021-12-27 NOTE — ED NOTES
I have reviewed discharge instructions with the patient. The patient verbalized understanding. Patient left ED via Discharge Method: ambulatory to Home with self. Opportunity for questions and clarification provided. Patient given 2 scripts. To continue your aftercare when you leave the hospital, you may receive an automated call from our care team to check in on how you are doing. This is a free service and part of our promise to provide the best care and service to meet your aftercare needs.  If you have questions, or wish to unsubscribe from this service please call [phone]. Thank you for Choosing our [address] [place] [address] Emergency Department.

## 2021-12-27 NOTE — ED PROVIDER NOTES
Patient is a 57-year-old female with no significant past medical history. She has not been vaccinated for Covid but has had Covid last year. She states on Saturday she started having a mild dry cough and sore throat. She also has developed some sinus congestion. She also has been sneezing and has a headache. She denies any fever or vomiting, denies any known sick contacts. She has not taken any medicine for her symptoms. History reviewed. No pertinent past medical history. No past surgical history on file. History reviewed. No pertinent family history. Social History     Socioeconomic History    Marital status:      Spouse name: Not on file    Number of children: Not on file    Years of education: Not on file    Highest education level: Not on file   Occupational History    Not on file   Tobacco Use    Smoking status: Never Smoker    Smokeless tobacco: Never Used   Substance and Sexual Activity    Alcohol use: Yes     Comment: socially    Drug use: Not Currently    Sexual activity: Not on file   Other Topics Concern    Not on file   Social History Narrative    Not on file     Social Determinants of Health     Financial Resource Strain:     Difficulty of Paying Living Expenses: Not on file   Food Insecurity:     Worried About Running Out of Food in the Last Year: Not on file    Deyvi of Food in the Last Year: Not on file   Transportation Needs:     Lack of Transportation (Medical): Not on file    Lack of Transportation (Non-Medical):  Not on file   Physical Activity:     Days of Exercise per Week: Not on file    Minutes of Exercise per Session: Not on file   Stress:     Feeling of Stress : Not on file   Social Connections:     Frequency of Communication with Friends and Family: Not on file    Frequency of Social Gatherings with Friends and Family: Not on file    Attends Oriental orthodox Services: Not on file    Active Member of Clubs or Organizations: Not on file  Attends Club or Organization Meetings: Not on file    Marital Status: Not on file   Intimate Partner Violence:     Fear of Current or Ex-Partner: Not on file    Emotionally Abused: Not on file    Physically Abused: Not on file    Sexually Abused: Not on file   Housing Stability:     Unable to Pay for Housing in the Last Year: Not on file    Number of [place] in the Last Year: Not on file    Unstable Housing in the Last Year: Not on file         ALLERGIES: Patient has no known allergies. Review of Systems   Constitutional: Negative for chills and fever. HENT: Positive for congestion and sore throat. Respiratory: Positive for cough. Gastrointestinal: Negative for nausea and vomiting. Neurological: Positive for headaches. All other systems reviewed and are negative. Vitals:    12/27/21 0333   BP: 106/72   Pulse: (!) 103   Resp: 20   Temp: 98.8 °F (37.1 °C)   SpO2: 99%   Weight: 125.6 kg (276 lb 14.4 oz)   Height: 5' 2\" (1.575 m)            Physical Exam  Vitals and nursing note reviewed. Constitutional:       Appearance: Normal appearance. She is well-developed. HENT:      Head: Normocephalic and atraumatic. Eyes:      Conjunctiva/sclera: Conjunctivae normal.      Pupils: Pupils are equal, round, and reactive to light. Cardiovascular:      Rate and Rhythm: Normal rate and regular rhythm. Pulmonary:      Effort: Pulmonary effort is normal. No respiratory distress. Breath sounds: No wheezing or rales. Musculoskeletal:         General: No tenderness. Cervical back: Normal range of motion and neck supple. Skin:     General: Skin is warm and dry. Neurological:      Mental Status: She is alert and oriented to person, place, and time.    Psychiatric:         Behavior: Behavior normal.          MDM  Number of Diagnoses or Management Options  Viral syndrome: new and does not require workup  Diagnosis management comments: 6:29 AM Covid swab is negative, discussed this with patient.        Amount and/or Complexity of Data Reviewed  Clinical lab tests: ordered and reviewed    Risk of Complications, Morbidity, and/or Mortality  Presenting problems: moderate  Diagnostic procedures: low  Management options: moderate    Patient Progress  Patient progress: improved         Procedures

## 2022-06-29 ENCOUNTER — TELEPHONE (OUTPATIENT)
Dept: ORTHOPEDIC SURGERY | Age: 27
End: 2022-06-29

## 2022-07-06 ENCOUNTER — HOSPITAL ENCOUNTER (EMERGENCY)
Dept: GENERAL RADIOLOGY | Age: 27
Discharge: HOME | End: 2022-07-09
Payer: COMMERCIAL

## 2022-07-06 ENCOUNTER — HOSPITAL ENCOUNTER (EMERGENCY)
Age: 27
Discharge: HOME | End: 2022-07-06
Attending: EMERGENCY MEDICINE
Payer: COMMERCIAL

## 2022-07-06 VITALS
HEIGHT: 62 IN | OXYGEN SATURATION: 100 % | TEMPERATURE: 98.2 F | HEART RATE: 54 BPM | DIASTOLIC BLOOD PRESSURE: 61 MMHG | BODY MASS INDEX: 49.32 KG/M2 | SYSTOLIC BLOOD PRESSURE: 123 MMHG | RESPIRATION RATE: 18 BRPM | WEIGHT: 268 LBS

## 2022-07-06 DIAGNOSIS — R07.9 CHEST PAIN, UNSPECIFIED TYPE: Primary | ICD-10-CM

## 2022-07-06 LAB
ALBUMIN SERPL-MCNC: 3.2 G/DL (ref 3.5–5)
ALBUMIN/GLOB SERPL: 0.8 {RATIO} (ref 1.2–3.5)
ALP SERPL-CCNC: 85 U/L (ref 50–130)
ALT SERPL-CCNC: 18 U/L (ref 12–65)
ANION GAP SERPL CALC-SCNC: 5 MMOL/L (ref 7–16)
AST SERPL-CCNC: 15 U/L (ref 15–37)
BASOPHILS # BLD: 0 K/UL (ref 0–0.2)
BASOPHILS NFR BLD: 1 % (ref 0–2)
BILIRUB SERPL-MCNC: 0.4 MG/DL (ref 0.2–1.1)
BUN SERPL-MCNC: 10 MG/DL (ref 6–23)
CALCIUM SERPL-MCNC: 9 MG/DL (ref 8.3–10.4)
CHLORIDE SERPL-SCNC: 109 MMOL/L (ref 98–107)
CO2 SERPL-SCNC: 26 MMOL/L (ref 21–32)
CREAT SERPL-MCNC: 0.8 MG/DL (ref 0.6–1)
DIFFERENTIAL METHOD BLD: ABNORMAL
EKG ATRIAL RATE: 61 BPM
EKG DIAGNOSIS: NORMAL
EKG P AXIS: 65 DEGREES
EKG P-R INTERVAL: 194 MS
EKG Q-T INTERVAL: 396 MS
EKG QRS DURATION: 94 MS
EKG QTC CALCULATION (BAZETT): 398 MS
EKG R AXIS: 64 DEGREES
EKG T AXIS: 41 DEGREES
EKG VENTRICULAR RATE: 61 BPM
EOSINOPHIL # BLD: 0.1 K/UL (ref 0–0.8)
EOSINOPHIL NFR BLD: 2 % (ref 0.5–7.8)
ERYTHROCYTE [DISTWIDTH] IN BLOOD BY AUTOMATED COUNT: 20.2 % (ref 11.9–14.6)
GLOBULIN SER CALC-MCNC: 4.1 G/DL (ref 2.3–3.5)
GLUCOSE SERPL-MCNC: 88 MG/DL (ref 65–100)
HCT VFR BLD AUTO: 31.2 % (ref 35.8–46.3)
HGB BLD-MCNC: 8.8 G/DL (ref 11.7–15.4)
IMM GRANULOCYTES # BLD AUTO: 0 K/UL (ref 0–0.5)
IMM GRANULOCYTES NFR BLD AUTO: 0 % (ref 0–5)
LYMPHOCYTES # BLD: 2 K/UL (ref 0.5–4.6)
LYMPHOCYTES NFR BLD: 41 % (ref 13–44)
MCH RBC QN AUTO: 16.7 PG (ref 26.1–32.9)
MCHC RBC AUTO-ENTMCNC: 28.2 G/DL (ref 31.4–35)
MCV RBC AUTO: 59.2 FL (ref 79.6–97.8)
MONOCYTES # BLD: 0.5 K/UL (ref 0.1–1.3)
MONOCYTES NFR BLD: 11 % (ref 4–12)
NEUTS SEG # BLD: 2.2 K/UL (ref 1.7–8.2)
NEUTS SEG NFR BLD: 45 % (ref 43–78)
NRBC # BLD: 0 K/UL (ref 0–0.2)
PLATELET # BLD AUTO: 412 K/UL (ref 150–450)
PMV BLD AUTO: 9.6 FL (ref 9.4–12.3)
POTASSIUM SERPL-SCNC: 4.5 MMOL/L (ref 3.5–5.1)
PROT SERPL-MCNC: 7.3 G/DL (ref 6.3–8.2)
RBC # BLD AUTO: 5.27 M/UL (ref 4.05–5.2)
SARS-COV-2 RDRP RESP QL NAA+PROBE: NOT DETECTED
SODIUM SERPL-SCNC: 140 MMOL/L (ref 136–145)
SOURCE: NORMAL
TROPONIN I SERPL HS-MCNC: 5.2 PG/ML (ref 0–14)
TROPONIN I SERPL HS-MCNC: 5.8 PG/ML (ref 0–14)
WBC # BLD AUTO: 4.9 K/UL (ref 4.3–11.1)

## 2022-07-06 PROCEDURE — 87635 SARS-COV-2 COVID-19 AMP PRB: CPT

## 2022-07-06 PROCEDURE — 80053 COMPREHEN METABOLIC PANEL: CPT

## 2022-07-06 PROCEDURE — 99285 EMERGENCY DEPT VISIT HI MDM: CPT

## 2022-07-06 PROCEDURE — 71045 X-RAY EXAM CHEST 1 VIEW: CPT

## 2022-07-06 PROCEDURE — 84484 ASSAY OF TROPONIN QUANT: CPT

## 2022-07-06 PROCEDURE — 93005 ELECTROCARDIOGRAM TRACING: CPT | Performed by: EMERGENCY MEDICINE

## 2022-07-06 PROCEDURE — 85025 COMPLETE CBC W/AUTO DIFF WBC: CPT

## 2022-07-06 ASSESSMENT — PAIN SCALES - GENERAL
PAINLEVEL_OUTOF10: 7
PAINLEVEL_OUTOF10: 7

## 2022-07-06 ASSESSMENT — ENCOUNTER SYMPTOMS
COUGH: 0
SWOLLEN GLANDS: 0
ABDOMINAL PAIN: 0
VOMITING: 0
WHEEZING: 0
SORE THROAT: 0
HEMOPTYSIS: 0
SHORTNESS OF BREATH: 1

## 2022-07-06 ASSESSMENT — PAIN DESCRIPTION - LOCATION: LOCATION: CHEST

## 2022-07-06 ASSESSMENT — PAIN - FUNCTIONAL ASSESSMENT
PAIN_FUNCTIONAL_ASSESSMENT: 0-10
PAIN_FUNCTIONAL_ASSESSMENT: 0-10

## 2022-07-06 ASSESSMENT — PAIN DESCRIPTION - DESCRIPTORS: DESCRIPTORS: TIGHTNESS

## 2022-07-06 NOTE — ED NOTES
I have reviewed discharge instructions with the patient. The patient verbalized understanding. Patient left ED via Discharge Method: ambulatory to Home. Opportunity for questions and clarification provided. Patient given 0 scripts. To continue your aftercare when you leave the hospital, you may receive an automated call from our care team to check in on how you are doing. This is a free service and part of our promise to provide the best care and service to meet your aftercare needs.  If you have questions, or wish to unsubscribe from this service please call [phone]. Thank you for Choosing our [facility] Emergency Department.         Erickson Manrique RN  07/06/22 9623

## 2022-07-06 NOTE — Clinical Note
Jj Mcneill was seen and treated in our emergency department on 7/6/2022. She may return to work on 07/07/2022. If you have any questions or concerns, please don't hesitate to call.       Estelle Rodriguez, MARY - CNP

## 2022-07-06 NOTE — ED PROVIDER NOTES
[facility] Emergency Department Provider Note                   PCP:                None Provider               Age: 31 y.o. Sex: female       ICD-10-CM    1. Chest pain, unspecified type  R07.9        DISPOSITION Decision To Discharge 07/06/2022 02:26:38 PM       MDM  Number of Diagnoses or Management Options  Chest pain, unspecified type: new, needed workup  Diagnosis management comments: 69-year-old female who presents emergency department today with complaints of chest pain and shortness of breath. She is well-appearing on exam.  Lung sounds are clear. Vital signs are within normal limits. Work-up today is without acute findings. Noted anemia on her CBC but this appears to be at her baseline. I have encouraged her to start taking a daily iron supplement. We will provide referral to primary care. I have encouraged her to follow-up for recheck of her symptoms and to discuss her feelings of stress and anxiety as she thinks this may be The cause of her symptoms today. I have discussed the results of all labs, procedures, radiographs, and/or treatments with the patient and available family members. Treatment plan is agreed upon by the patient and the patient is ready for discharge. Questions about treatment in the ED and differential diagnosis of presenting condition were answered. Patient was given verbal discharge instructions including, but not limited to, importance of returning to the emergency department for any concern of worsening or continued symptoms. Instructions were given to follow-up with a primary care provider or specialist within 1 to 2 days.         Amount and/or Complexity of Data Reviewed  Clinical lab tests: reviewed  Tests in the radiology section of CPT®: reviewed    Risk of Complications, Morbidity, and/or Mortality  Presenting problems: moderate  Diagnostic procedures: moderate  Management options: moderate    Patient Progress  Patient progress: improved       Orders Placed This Encounter   Procedures    COVID-19, Rapid    XR CHEST PORTABLE    Troponin    CMP    CBC with Auto Differential    Troponin    EKG 12 Lead    Saline lock IV        Odilia Larson is a 31 y.o. female who presents to the Emergency Department with chief complaint of    Chief Complaint   Patient presents with    Shortness of Breath    Chest Pain      Otherwise healthy 25-year-old female who presents to the emergency department today with complaint of left-sided chest pain and shortness of breath. Symptoms began yesterday. She states she was at work when the symptoms began. She denies any aggravating or relieving factors. She has not attempted any treatment at home prior to coming to the emergency department. She denies any recent illness, cough, abdominal pain, nausea, vomiting, or diarrhea. She states that she has been under a lot of stress recently and is unsure if her symptoms are related to this. The history is provided by the patient. Shortness of Breath  Severity:  Moderate  Onset quality:  Gradual  Timing:  Constant  Progression:  Unchanged  Chronicity:  New  Relieved by:  None tried  Worsened by:  Nothing  Ineffective treatments:  None tried  Associated symptoms: chest pain    Associated symptoms: no abdominal pain, no cough, no diaphoresis, no fever, no headaches, no hemoptysis, no neck pain, no sore throat, no syncope, no swollen glands, no vomiting and no wheezing    Chest pain:     Quality: tightness      Severity:  Moderate    Onset quality:  Gradual    Timing:  Constant    Progression:  Unchanged    Chronicity:  New      All other systems reviewed and are negative. Review of Systems   Constitutional: Negative for diaphoresis and fever. HENT: Negative for sore throat. Respiratory: Positive for shortness of breath. Negative for cough, hemoptysis and wheezing. Cardiovascular: Positive for chest pain. Negative for syncope.    Gastrointestinal: Negative for abdominal pain and vomiting. Musculoskeletal: Negative for neck pain. Neurological: Negative for headaches. All other systems reviewed and are negative. History reviewed. No pertinent past medical history. History reviewed. No pertinent surgical history. History reviewed. No pertinent family history. Social Connections:     Frequency of Communication with Friends and Family: Not on file    Frequency of Social Gatherings with Friends and Family: Not on file    Attends Rastafarian Services: Not on file    Active Member of Clubs or Organizations: Not on file    Attends Club or Organization Meetings: Not on file    Marital Status: Not on file        No Known Allergies     Vitals signs and nursing note reviewed. Patient Vitals for the past 4 hrs:   Temp Pulse Resp BP SpO2   07/06/22 1318 -- 55 -- -- 100 %   07/06/22 1248 -- 56 -- -- 100 %   07/06/22 1218 -- 59 -- -- 97 %   07/06/22 1124 98.4 °F (36.9 °C) 64 18 115/77 100 %          Physical Exam  Vitals and nursing note reviewed. Constitutional:       General: She is not in acute distress. Appearance: Normal appearance. She is well-developed. She is obese. She is not ill-appearing, toxic-appearing or diaphoretic. HENT:      Head: Normocephalic and atraumatic. Mouth/Throat:      Mouth: Mucous membranes are moist.   Eyes:      General: No scleral icterus. Extraocular Movements: Extraocular movements intact. Cardiovascular:      Rate and Rhythm: Normal rate and regular rhythm. Heart sounds: Normal heart sounds. Pulmonary:      Effort: Pulmonary effort is normal. No respiratory distress. Breath sounds: Normal breath sounds. Comments: Respirations are even and unlabored. Patient is speaking in full sentences without difficulty. Abdominal:      General: Abdomen is flat. There is no distension. Skin:     General: Skin is warm and dry. Capillary Refill: Capillary refill takes less than 2 seconds.    Neurological: General: No focal deficit present. Mental Status: She is alert and oriented to person, place, and time. Psychiatric:         Mood and Affect: Mood normal.         Behavior: Behavior normal.          Procedures    ED EKG Interpretation  EKG was interpreted in the absence of a cardiologist.    Rate: Rate: Normal  EKG Interpretation: EKG Interpretation: sinus rhythm  ST Segments: Normal ST segments - NO STEMI    Labs Reviewed   COMPREHENSIVE METABOLIC PANEL - Abnormal; Notable for the following components:       Result Value    Chloride 109 (*)     Anion Gap 5 (*)     Albumin 3.2 (*)     Globulin 4.1 (*)     Albumin/Globulin Ratio 0.8 (*)     All other components within normal limits   CBC WITH AUTO DIFFERENTIAL - Abnormal; Notable for the following components:    RBC 5.27 (*)     Hemoglobin 8.8 (*)     Hematocrit 31.2 (*)     MCV 59.2 (*)     MCH 16.7 (*)     MCHC 28.2 (*)     RDW 20.2 (*)     All other components within normal limits   COVID-19, RAPID   TROPONIN   TROPONIN        XR CHEST PORTABLE   Final Result      1. No acute cardiopulmonary process. CPT code(s) [id number]                                     ED Course as of 07/06/22 1429 Wed Jul 06, 2022   1250 XR CHEST PORTABLE  IMPRESSION:     1. No acute cardiopulmonary process. [BC]      ED Course User Index  [BC] MARY Longoria - [facility]        Voice dictation software was used during the making of this note. This software is not perfect and grammatical and other typographical errors may be present. This note has not been completely proofread for errors.        MARY Longoria - [place]  07/06/22 1429

## 2022-07-27 ENCOUNTER — APPOINTMENT (RX ONLY)
Dept: URBAN - METROPOLITAN AREA CLINIC 329 | Facility: CLINIC | Age: 27
Setting detail: DERMATOLOGY
End: 2022-07-27

## 2022-07-27 DIAGNOSIS — L21.8 OTHER SEBORRHEIC DERMATITIS: ICD-10-CM | Status: INADEQUATELY CONTROLLED

## 2022-07-27 PROCEDURE — 99204 OFFICE O/P NEW MOD 45 MIN: CPT

## 2022-07-27 PROCEDURE — ? PRESCRIPTION

## 2022-07-27 PROCEDURE — ? COUNSELING

## 2022-07-27 RX ORDER — KETOCONAZOLE 20 MG/ML
SHAMPOO, SUSPENSION TOPICAL
Qty: 120 | Refills: 5 | Status: ERX | COMMUNITY
Start: 2022-07-27

## 2022-07-27 RX ORDER — FLUOCINOLONE ACETONIDE 0.11 MG/ML
OIL TOPICAL
Qty: 236.56 | Refills: 3 | Status: ERX | COMMUNITY
Start: 2022-07-27

## 2022-07-27 RX ADMIN — FLUOCINOLONE ACETONIDE: 0.11 OIL TOPICAL at 00:00

## 2022-07-27 RX ADMIN — KETOCONAZOLE: 20 SHAMPOO, SUSPENSION TOPICAL at 00:00

## 2022-09-19 ENCOUNTER — OFFICE VISIT (OUTPATIENT)
Dept: ORTHOPEDIC SURGERY | Age: 27
End: 2022-09-19
Payer: COMMERCIAL

## 2022-09-19 VITALS — BODY MASS INDEX: 49.32 KG/M2 | WEIGHT: 268 LBS | HEIGHT: 62 IN

## 2022-09-19 DIAGNOSIS — M79.671 PAIN OF RIGHT HEEL: Primary | ICD-10-CM

## 2022-09-19 PROCEDURE — 99213 OFFICE O/P EST LOW 20 MIN: CPT | Performed by: ORTHOPAEDIC SURGERY

## 2022-09-19 NOTE — PROGRESS NOTES
Name: Estela Short [facility]  YOB: 1995  Gender: female  MRN: [id number]    CC: Right foot pain    HPI:   Early April 2022: Onset of heel pain and gradually increased  04/23/2022: Treated at [facility]  04/29/2022: Initial visit with me for right heel pain  05/16/2022: Diagnosed with right lateral heel pain with suspected lateral calcaneal periostitis. Lateral heel was injected  09/19/2022: She presents with persistent plantar lateral heel pain: Some improvement with prior treatment but incomplete resolution    ROS/Meds/PSH/PMH/FH/SH: reviewed today    Tobacco:  reports that she has never smoked. She has never used smokeless tobacco.     Physical Examination:  Patient appears to be alert and oriented with acceptable appearance. No obvious distress or SOB  CV: appears to have acceptable vascular color and capillary refill  Neuro: appears to have mostly intact light touch sensation   Skin: No soft tissue swelling  MS: Standing: Pes planus: Gait a little protected  Right = plantar lateral heel calcaneal periosteal pain; no Achilles pain; no medial heel pain    XR: No indication for new x-rays today  XR Impression:  As above      Reviewed Test/Records/Documents: 05/16/2022: X-rays at that time with no fracture  Injection: Done 05/16/2022 in the lateral calcaneal periosteum    Assessment:    Right persistent plantar lateral heel pain uncertain etiology    Plan:   The patient and I discussed the above assessment. We explored treatment options.      05/16/2022: I diagnosed right lateral heel pain with suspected lateral calcaneal periostitis  She never obtained OOFOS shoes but she has failed injection, medication, pads and limited activity  Plan is MRI scan to see if this is a surgical condition  Advanced medical imaging: Right foot MRI scan: Assess plantar lateral calcaneal painful calcaneal periostitis, abductor tendinitis, possible fascial tear    In the past, we have discussed: 3D boot: Air heel: OOFOS shahida  05/16/2022: I prescribed physical therapy  Medication - OTC meds prn: Prior prescribed Mobic    Surgical discussion: Surgical indications will be based on MRI scan  Follow up: After MRI scan  Work status: Regular  History and discussion of management with: The patient    This note was created using Dragon voice recognition software which may result in errors of speech and spelling recognition and word/phrase syntax errors.

## 2022-10-05 ENCOUNTER — OFFICE VISIT (OUTPATIENT)
Dept: ORTHOPEDIC SURGERY | Age: 27
End: 2022-10-05
Payer: COMMERCIAL

## 2022-10-05 DIAGNOSIS — M79.671 PAIN OF RIGHT HEEL: ICD-10-CM

## 2022-10-05 DIAGNOSIS — M62.9 NONTRAUMATIC TEAR OF PLANTAR FASCIA: Primary | ICD-10-CM

## 2022-10-05 DIAGNOSIS — M79.671 PAIN IN RIGHT FOOT: ICD-10-CM

## 2022-10-05 PROCEDURE — 99213 OFFICE O/P EST LOW 20 MIN: CPT | Performed by: ORTHOPAEDIC SURGERY

## 2022-10-05 RX ORDER — FLUOCINOLONE ACETONIDE 0.11 MG/ML
OIL TOPICAL
COMMUNITY
Start: 2022-07-27

## 2022-10-05 RX ORDER — KETOCONAZOLE 20 MG/ML
SHAMPOO TOPICAL
COMMUNITY
Start: 2022-07-27

## 2022-10-05 RX ORDER — ERGOCALCIFEROL (VITAMIN D2) 1250 MCG
50000 CAPSULE ORAL
COMMUNITY
Start: 2022-01-24

## 2022-10-05 NOTE — PROGRESS NOTES
Name: Thiago Ellis  YOB: 1995  Gender: female  MRN: [id number]    09/19/2022: Initial visit with me for: Right foot pain  10/05/2022: She is here to discuss MRI scan. HPI:   Early April 2022: Onset of heel pain and gradually increased  04/23/2022: Treated at [facility]  04/29/2022: Initial visit with me for right heel pain  05/16/2022: Diagnosed with right lateral heel pain with suspected lateral calcaneal periostitis. Lateral heel was injected  09/19/2022: She presented with persistent plantar lateral heel pain: Some improvement with prior treatment but incomplete resolution    ROS/Meds/PSH/PMH/FH/SH: reviewed today    Tobacco:  reports that she has never smoked. She has never used smokeless tobacco.     Physical Examination:  Patient appears to be alert and oriented with acceptable appearance. No obvious distress or SOB  CV: appears to have acceptable vascular color and capillary refill  Neuro: appears to have mostly intact light touch sensation   Skin: No soft tissue swelling  MS: Standing: Pes planus: Gait in regular slides  Right = plantar lateral heel calcaneal periosteal pain; no Achilles pain; no medial heel pain    XR: No indication for new x-rays today  XR Impression:  As above      Reviewed Test/Records/Documents:   05/16/2022: X-rays at that time with no fracture  10/03/2022: Right foot MRI scan without contrast: Radiology impression:  1. Plantar fasciitis most prominently involving the lateral band and to lesser extent the central band with low-grade partial-thickness tearing of the lateral band  2. Moderate edema deep to the plantar fascia and minimal edema within the plantar calcaneus    Injection: Done 05/16/2022 in the lateral calcaneal periosteum    Assessment:    Right MRI scan plantar fasciitis, central and lateral band partial tears    Plan:   The patient and I discussed the above assessment. We explored treatment options.      MRI scan reveals central and lateral band plantar fasciitis with partial tearing  I recommend she obtain OOFOS shoes  She has tried a 3D boot but currently is in an Air heel  PT: Prescribed at [facility] side  Medication - OTC meds prn: Prior prescribed Mobic    Surgical discussion: Discussed potential plantar fascial surgery, but hold surgery at this time  Follow up: 4 weeks  Work status: Out of work for weeks  History and discussion of management with: The patient    This note was created using Dragon voice recognition software which may result in errors of speech and spelling recognition and word/phrase syntax errors.

## 2022-10-05 NOTE — LETTER
[address] [address]  Phone: [phone]  Fax: [phone]    Anyi Alcocer MD        October 5, 2022     Patient: Hellen Sands   YOB: 1995   Date of Visit: 10/5/2022       To Whom It May Concern: It is my medical opinion that Noa Recinos Work Status : Out of work for 4 weeks    If you have any questions or concerns, please don't hesitate to call.     Sincerely,        Anyi Alcocer MD

## 2022-10-11 ENCOUNTER — TELEPHONE (OUTPATIENT)
Dept: ORTHOPEDIC SURGERY | Age: 27
End: 2022-10-11

## 2022-10-11 NOTE — TELEPHONE ENCOUNTER
Forms received for [facility] [employer], sent for completion

## 2022-10-17 ENCOUNTER — TELEPHONE (OUTPATIENT)
Dept: ORTHOPEDIC SURGERY | Age: 27
End: 2022-10-17

## 2022-10-17 NOTE — TELEPHONE ENCOUNTER
Patient called to  [id number] form, forms faxed to [facility] [employer] [facility] [employer], copies sent to scanning

## 2022-11-01 ENCOUNTER — OFFICE VISIT (OUTPATIENT)
Dept: ORTHOPEDIC SURGERY | Age: 27
End: 2022-11-01
Payer: COMMERCIAL

## 2022-11-01 DIAGNOSIS — Z74.09 DECREASED FUNCTIONAL MOBILITY AND ENDURANCE: ICD-10-CM

## 2022-11-01 DIAGNOSIS — M79.671 PAIN OF RIGHT HEEL: Primary | ICD-10-CM

## 2022-11-01 DIAGNOSIS — R53.1 WEAKNESS GENERALIZED: ICD-10-CM

## 2022-11-01 DIAGNOSIS — R26.2 DIFFICULTY IN WALKING: ICD-10-CM

## 2022-11-01 DIAGNOSIS — M79.671 PAIN IN RIGHT FOOT: ICD-10-CM

## 2022-11-01 DIAGNOSIS — M62.9 NONTRAUMATIC TEAR OF PLANTAR FASCIA: ICD-10-CM

## 2022-11-01 PROCEDURE — 97110 THERAPEUTIC EXERCISES: CPT | Performed by: PHYSICAL THERAPIST

## 2022-11-01 PROCEDURE — 97162 PT EVAL MOD COMPLEX 30 MIN: CPT | Performed by: PHYSICAL THERAPIST

## 2022-11-01 NOTE — PROGRESS NOTES
[address] [facility] - [address] [address]  Dept: [phone]      Physical Therapy Initial Assessment     Referring MD: Lieutenant Jaylan MD  Diagnosis:     ICD-10-CM    1. Pain of right heel  M79.671       2. Difficulty in walking  R26.2       3. Weakness generalized  R53.1       4. Decreased functional mobility and endurance  Z74.09          Therapy precautions:None  Co-morbidities affecting plan of care: H/o right hip injury requiring crutches and resultant altered gait pattern for about 1 month    Total Timed Procedure Codes: 25 min, Total Time: 50 min  Time In: 11:00 AM  Time Out: 11:50 AM    PERTINENT MEDICAL HISTORY     Past medical and surgical history:   History reviewed. No pertinent past medical history. History reviewed. No pertinent surgical history. Medications: reviewed in chart   Allergies: No Known Allergies     SUBJECTIVE     Chief complaints/history of injury:  Patient is a 31 y.o. female with a PMH complicated as noted above. She presents to PT with c/o right lateral heel pain. Date symptoms began: Early April 2022  Conchis Geller of condition: Chronic (continuous duration > 3 months)  Primary cause of current episode: Unspecified  How did symptoms start: Insidious onset; denies new activities, repeated activities, or held positions   Describe current symptoms: Patient reports lateral heel pain when weightbearing through her RLE. Received previous therapy?  No has failed injection, medication, pads and limited activity    Diagnostic exams (per chart review): per 10/5/2022 MD note  Right MRI scan plantar fasciitis, central and lateral band partial tears    Pain Assessment:    Pain Characteristics   Intensity: 4-8/10   Location: Lateral right heel that shoots to lateral malleoli   Description: Stiff, shooting, sore, deep ache   How often do you feel symptoms: Constant (% of time)   Aggravating factors: Mornings, weight bearing Alleviating factors: Cryotherapy and avoiding weight bearing       Neuro screen: numbness to plantar heel    Social/Functional Hx: How would you rate your overall health? good  Pt lives alone in a(n) apartment on the 2nd floor  Current DME: none  Work Status: Employed full time: Requires prolonged walking, prolonged standing, floor to waist lifts of up to 50 lbs; Currently not working the month of November d/t current condition  Sleep:  Has pain when she wakes in the middle of the night to go to the rest room  PLOF & Social Hx/Interests:  Independent and active without physical limitations, Independent community ambulator, Independent household ambulator, Independent with all ADLs, and drove independently  Current level of function: modified independent with ADLs, IADLs, MRADLs, and work activities d/t concomitant pain    Patient Stated Goals:   Stair negotiation to enter/exit apartment  Improve ambulation tolerance for work   Perform floor to waist lifts for work duties      OBJECTIVE     Functional Outcome Questionnaire: Lower Extremity Functional Scale: 34/80 = 43% Function   Observation:   Posture: Bilateral pronation  Swelling/Edema: Figure 8 52.5 cm R, 52 cm L  Skin Integrity: Moderate to severe soft tissue hypomobility   Palpation/joint mobility: tender to palpation Right quadratus plantae, abductor digiti minimi, achilles tendon, and peroneals      A/PROM Ankle   RIGHT LEFT Quality of movement   Dorsiflexion: 4° 16°    Plantar flexion: 40° 45°    Inversion: 24° 40°    Eversion: 6° 14°        MMT: Right Left Quality of Testing   FOOT:      Dorsiflexion 3/5 5/5    Plantarflexion 4/5 5/5    Inversion 2+/5 5/5    Eversion 2+/5 5/5      Special Tests/Function:   Gait:   Assistive Device None   Initial Contact Forefoot   Mid-stance Forefoot contact   Terminal Stance Early Heel Off   Swing Phase Passes stance leg   Gait Speed Below Functional Limits   Quality Antalgic      Treatment provided today consisted of initial evaluation followed by: Therapeutic exercise (42674) x 25 min to address ROM/strength deficits and to develop an initial HEP as noted below. Access Code: [facility]  URL: [email or web address]  Date: 11/01/2022  Prepared by: Greg Cotto DPT    Exercises  Long Sitting Calf Stretch with Strap - 3-5 x daily - 7 x weekly - 1-2 sets - 30 seconds hold  Long Sitting Ankle PROM Inversion Eversion - 3-5 x daily - 7 x weekly - 1-2 sets - 30 seconds hold  Seated Toe Curl - 3-5 x daily - 7 x weekly - 3 sets - 10 reps  Toe Spreading - 3-5 x daily - 7 x weekly - 3 sets - 10 reps  Supine Ankle Inversion Eversion AROM - 3-5 x daily - 7 x weekly - 1-2 sets - 10 reps  Supine Ankle Pumps - 3-5 x daily - 7 x weekly - 1-2 sets - 10 reps  Ice - 2 x daily - 7 x weekly - 15-20 minutes duration      Patient Education on the condition/pathology, involved anatomy, exercise rationale, and cryotherapy. Patient was issued a written copy of her HEP which she demonstrated with good form and technique. Tactile and verbal cuing provided to facilitate proper form and technique. Patient was also issued a written handout on dry needling treatment      Plan for Next Session: Dry needling to the right peroneals and abductor minimi    CLINICAL DECISION MAKING/ASSESSMENT     Personal Factors/co-morbidities affecting POC (1-2 Medium/3+High): body measurements  profession   Problem List: (1-2 Low/ 3 Medium/ 4+ High) Pain  ROM limitations  Soft tissue restrictions  Strength deficits  Motor control deficits  Impaired posture  Impaired transfers  Impaired gait  Impaired balance/proprioception  Decreased endurance/activity Tolerance  Limited work/occupational capacity  Restricted recreational participation    Clinical decision making: moderate complexity with questionable prediction of expectations and future outcomes which may require adjustments to the POC.   Prognosis: good   Benefits and precautions of treatment explained to patient. Devon Parker is a 31 y.o. female who presents to therapy today with evolving/changing clinical presentation (moderate complexity)  related to plantar fasciitis. Pt would benefit from skilled physical therapy services to address the deficits noted above for return to prior level of function. PLAN OF CARE     Effective Dates: 11/1/2022 TO 12/31/2022 (60 days). Frequency/Duration: 2x/week for 60 Day(s)  Interventions  may include but are not limited to: (93740) Therapeutic exercise to develop ROM, strength, endurance and flexibility  (43392) Therapeutic activities using dynamic activities to improve function  (27595) Gait training to address mechanics, proper step length and weight shifting to improve household and community mobility as well as overall safety with ADLs  (45834) Manual therapy techniques to improve joint and/or soft tissue mobility, ROM, and function as well as helping to decrease pain/spasms and swelling  (04045/34951) Dry needling for the management of neuromusculoskeletal pain and movement impairment  Home exercise program (HEP) development  Modalities prn to address pain, spasms, and swelling: (59420/) Electrical stimulation- unattended  (19985) Ultrasound/phonophoresis  (21700) Vasopneumatic compression  (32481) Iontophoresis      GOALS     Short term goals to be met by 11/29/2022  (4 weeks)  Patient will show good recall of their HEP requiring no more than minimal verbal cuing for proper form and technique. Increase AROM of involved ankle to be ? DF: 10°, PF: 45°, INV: 30°, EV: 10° to improve ability to walk with more normalized gait. Patient will report pain no greater than 6/10 to improve standing tolerance for ADLs and IADLs. Pt. will demonstrate standing posture with equal weight bearing through BLEs  Improve LEFS to ? 45/80. Patient will achieve an average score on the Patient-Specific Functional Scale ? 5/10 indicating improving functional mobility.     Long term goals to be met by 12/27/2022  (8 weeks)  Increase AROM of involved ankle to be ? DF: 15°, INV: 35°, EV: 15° to improve ability to walk with more normalized gait. Patient will report pain no greater than 3/10 to improve standing and walking tolerance for work duties. Pt. will improve SLS on firm surface to ? 10 seconds to be able to navigate over obstacles. Patient will achieve an average score on the Patient-Specific Functional Scale ? 7/10 indicating improving functional mobility. Improve LEFS to ? 55/80 allowing for improved Functional Deficit of 69% (Mobility).   Discharged from [address]

## 2022-11-02 ENCOUNTER — OFFICE VISIT (OUTPATIENT)
Dept: ORTHOPEDIC SURGERY | Age: 27
End: 2022-11-02
Payer: COMMERCIAL

## 2022-11-02 DIAGNOSIS — R26.2 DIFFICULTY IN WALKING: ICD-10-CM

## 2022-11-02 DIAGNOSIS — M79.671 PAIN IN RIGHT FOOT: ICD-10-CM

## 2022-11-02 DIAGNOSIS — M79.671 PAIN OF RIGHT HEEL: Primary | ICD-10-CM

## 2022-11-02 DIAGNOSIS — M62.9 NONTRAUMATIC TEAR OF PLANTAR FASCIA: ICD-10-CM

## 2022-11-02 PROCEDURE — 99213 OFFICE O/P EST LOW 20 MIN: CPT | Performed by: ORTHOPAEDIC SURGERY

## 2022-11-02 RX ORDER — MELOXICAM 15 MG/1
TABLET ORAL DAILY
COMMUNITY
Start: 2022-04-29

## 2022-11-02 NOTE — PROGRESS NOTES
Name: Hipolito Reza  YOB: 1995  Gender: female  MRN: [id number]    09/19/2022: Initial visit with me for: Right foot pain  10/05/2022: She presented to discuss MRI scan. 11/02/2022: She returns with persistent central to lateral heel pain    HPI:   Early April 2022: Onset of heel pain and gradually increased  04/23/2022: Treated at [facility]  04/29/2022: Initial visit with me for right heel pain  05/16/2022: Diagnosed with right lateral heel pain with suspected lateral calcaneal periostitis. Lateral heel was injected  09/19/2022: She presented with persistent plantar lateral heel pain: Some improvement with prior treatment but incomplete resolution    ROS/Meds/PSH/PMH/FH/SH: reviewed today    Tobacco:  reports that she has never smoked. She has never used smokeless tobacco.     Physical Examination:  Patient appears to be alert and oriented with acceptable appearance. No obvious distress or SOB  CV: appears to have acceptable vascular color and capillary refill  Neuro: appears to have mostly intact light touch sensation   Skin: No soft tissue swelling  MS: Standing: Pes planus: Gait in regular slides  Right = plantar lateral heel calcaneal periosteal to central pain; no medial heel pain; no Achilles pain  Right = good ankle/foot motion; 5/5 strength    XR: No indication for new x-rays today  XR Impression:  As above      Reviewed Test/Records/Documents:   05/16/2022: X-rays at that time with no fracture  10/03/2022: Right foot MRI scan without contrast: Radiology impression:  1. Plantar fasciitis most prominently involving the lateral band and to lesser extent the central band with low-grade partial-thickness tearing of the lateral band  2. Moderate edema deep to the plantar fascia and minimal edema within the plantar calcaneus    Injection: Done 05/16/2022 in the lateral calcaneal periosteum    Assessment:    Right MRI scan plantar fasciitis, central and lateral band partial tears    Plan:    The patient and I discussed the above assessment. We explored treatment options. Unfortunately, she continues to have pain despite medication, 3D boot: Air heel, HEP   She just started PT so hopefully PT can resolve her pain, but if no resolution, only option is surgery  I discussed with her the potential for Tenex procedure but would need her to see surgical partner  I see no indication for repeat MRI scan    PT: Prescribed at [facility] side  Medication - OTC meds prn: Prior prescribed Mobic    Surgical discussion: I discussed Tenex procedure with the possibility but she would need surgical partner consultation  Follow up: PT with Leslie: If no resolution or improvement, consultation with surgical partner  Work status: Out of work 4 weeks    This note was created using Dragon voice recognition software which may result in errors of speech and spelling recognition and word/phrase syntax errors.

## 2022-11-03 ENCOUNTER — OFFICE VISIT (OUTPATIENT)
Dept: ORTHOPEDIC SURGERY | Age: 27
End: 2022-11-03
Payer: COMMERCIAL

## 2022-11-03 DIAGNOSIS — M79.671 PAIN OF RIGHT HEEL: Primary | ICD-10-CM

## 2022-11-03 DIAGNOSIS — R53.1 WEAKNESS GENERALIZED: ICD-10-CM

## 2022-11-03 DIAGNOSIS — R26.2 DIFFICULTY IN WALKING: ICD-10-CM

## 2022-11-03 DIAGNOSIS — Z74.09 DECREASED FUNCTIONAL MOBILITY AND ENDURANCE: ICD-10-CM

## 2022-11-03 PROCEDURE — 97140 MANUAL THERAPY 1/> REGIONS: CPT | Performed by: PHYSICAL THERAPIST

## 2022-11-03 PROCEDURE — 97110 THERAPEUTIC EXERCISES: CPT | Performed by: PHYSICAL THERAPIST

## 2022-11-03 PROCEDURE — 97035 APP MDLTY 1+ULTRASOUND EA 15: CPT | Performed by: PHYSICAL THERAPIST

## 2022-11-03 PROCEDURE — 20560 NDL INSJ W/O NJX 1 OR 2 MUSC: CPT | Performed by: PHYSICAL THERAPIST

## 2022-11-03 NOTE — PROGRESS NOTES
[facility] [address] [address]  Dept: [phone]      Physical Therapy Daily Note     Referring MD: Enedina Johnston MD  Diagnosis:     ICD-10-CM    1. Pain of right heel  M79.671       2. Difficulty in walking  R26.2       3. Weakness generalized  R53.1       4. Decreased functional mobility and endurance  Z74.09            Therapy precautions:None  Co-morbidities affecting plan of care: H/o right hip injury requiring crutches and resultant altered gait pattern for about 1 month    Total Timed Procedure Codes: 37 min, Total Time: 50 min  Time In: 11:00 AM  Time Out: 11:50 AM    PERTINENT MEDICAL HISTORY     Past medical and surgical history:   No past medical history on file. No past surgical history on file. Medications: reviewed in chart   Allergies: No Known Allergies       Chief complaints/history of injury:  Patient is a 31 y.o. female with a PMH complicated as noted above. She presents to PT with c/o right lateral heel pain. Date symptoms began: Early April 2022  Jim Mchugh of condition: Chronic (continuous duration > 3 months)  Primary cause of current episode: Unspecified  How did symptoms start: Insidious onset; denies new activities, repeated activities, or held positions   Describe current symptoms: Patient reports lateral heel pain when weightbearing through her RLE. Received previous therapy? No has failed injection, medication, pads and limited activity    Diagnostic exams (per chart review): per 10/5/2022 MD note  Right MRI scan plantar fasciitis, central and lateral band partial tears    SUBJECTIVE   Patient reports compliance with HEP. OBJECTIVE       Treatment provided today consisted of:  Ultrasound (44034) x 8 minutes to right abductor digiti mini and quadratus plantae, at 1 MHz, 50% duty cycle, 1.2 w/cm2  assisting in reducing pain, muscle spasm/soft tissue restrictions.     Dry Needling (un-timed code) 27244: needle insertion(s) without injection(s); 1 or 2 muscle(s): Stimulating underlying myofascial trigger points, muscular and connective tissues for the management of neuromusculoskeletal pain and movement impairment   Patient was educated on dry needling treatment, expectations, and post-treatment instructions. Dry needling precautions/contraindications: Reviewed- none noted    Needles size and location: Right abductor digiti mini and quadratus plantae                                             Needle count: 2 needles inserted/ 2 needles removed   Position: Prone   Needle  technique left in situ x 8 minutes    Electrical Stimulation Not performed   Patient Response: Patient experienced no adverse response to treatment. Post-Test:  Ankle   11/1/2022 11/3/2022   Dorsiflexion: 4° 9°   Eversion: 6° 12°        Manual therapy (52242) x 15 min utilizing techniques to improve joint and/or soft tissue mobility, ROM, and function as well as helping to decrease pain/spasms and swelling. Palpation and assessment of soft tissue, muscles, and landmarks   IASTM to Right abductor digiti mini and quadratus plantae      Therapeutic exercise (04291) x 14 min to address ROM/strength deficits and to develop an initial HEP as noted below. Long sitting gastroc stretch - 3x30\"  Towel grab and lift x 15  Seated Heel/Toe Raises x 15      ASSESSMENT   Patient improved right ankle AROM in both dorsiflexion and eversion following dry needling treatment. She tolerated treatment without exacerbation of concordant symptoms. GOALS     Short term goals to be met by 11/29/2022  (4 weeks)  Patient will show good recall of their HEP requiring no more than minimal verbal cuing for proper form and technique. Increase AROM of involved ankle to be ? DF: 10°, PF: 45°, INV: 30°, EV: 10° to improve ability to walk with more normalized gait.    Patient will report pain no greater than 6/10 to improve standing tolerance for ADLs and IADLs.  Pt. will demonstrate standing posture with equal weight bearing through BLEs  Improve LEFS to ? 45/80. Patient will achieve an average score on the Patient-Specific Functional Scale ? 5/10 indicating improving functional mobility. Long term goals to be met by 12/27/2022  (8 weeks)  Increase AROM of involved ankle to be ? DF: 15°, INV: 35°, EV: 15° to improve ability to walk with more normalized gait. Patient will report pain no greater than 3/10 to improve standing and walking tolerance for work duties. Pt. will improve SLS on firm surface to ? 10 seconds to be able to navigate over obstacles. Patient will achieve an average score on the Patient-Specific Functional Scale ? 7/10 indicating improving functional mobility. Improve LEFS to ? 55/80 allowing for improved Functional Deficit of 69% (Mobility). Discharged from PT   PLAN        Access Code: [email or web address]: [email or web address]  Date: 11/01/2022  Prepared by: Cyndi Carbajal DPT    Exercises  Long Sitting Calf Stretch with Strap - 3-5 x daily - 7 x weekly - 1-2 sets - 30 seconds hold  Long Sitting Ankle PROM Inversion Eversion - 3-5 x daily - 7 x weekly - 1-2 sets - 30 seconds hold  Seated Toe Curl - 3-5 x daily - 7 x weekly - 3 sets - 10 reps  Toe Spreading - 3-5 x daily - 7 x weekly - 3 sets - 10 reps  Supine Ankle Inversion Eversion AROM - 3-5 x daily - 7 x weekly - 1-2 sets - 10 reps  Supine Ankle Pumps - 3-5 x daily - 7 x weekly - 1-2 sets - 10 reps  Ice - 2 x daily - 7 x weekly - 15-20 minutes duration      [employer]

## 2022-11-07 ENCOUNTER — OFFICE VISIT (OUTPATIENT)
Dept: ORTHOPEDIC SURGERY | Age: 27
End: 2022-11-07
Payer: COMMERCIAL

## 2022-11-07 DIAGNOSIS — R53.1 WEAKNESS GENERALIZED: ICD-10-CM

## 2022-11-07 DIAGNOSIS — R26.2 DIFFICULTY IN WALKING: ICD-10-CM

## 2022-11-07 DIAGNOSIS — Z74.09 DECREASED FUNCTIONAL MOBILITY AND ENDURANCE: ICD-10-CM

## 2022-11-07 DIAGNOSIS — M79.671 PAIN OF RIGHT HEEL: Primary | ICD-10-CM

## 2022-11-07 DIAGNOSIS — M79.671 PAIN IN RIGHT FOOT: ICD-10-CM

## 2022-11-07 PROCEDURE — 97110 THERAPEUTIC EXERCISES: CPT | Performed by: PHYSICAL THERAPIST

## 2022-11-07 PROCEDURE — 97140 MANUAL THERAPY 1/> REGIONS: CPT | Performed by: PHYSICAL THERAPIST

## 2022-11-07 PROCEDURE — 20560 NDL INSJ W/O NJX 1 OR 2 MUSC: CPT | Performed by: PHYSICAL THERAPIST

## 2022-11-07 PROCEDURE — 97035 APP MDLTY 1+ULTRASOUND EA 15: CPT | Performed by: PHYSICAL THERAPIST

## 2022-11-07 NOTE — PROGRESS NOTES
[facility] [address] [address]  Dept: [phone]      Physical Therapy Daily Note     Referring MD: Ebenezer Weeks MD  Diagnosis:     ICD-10-CM    1. Pain of right heel  M79.671       2. Decreased functional mobility and endurance  Z74.09       3. Difficulty in walking  R26.2       4. Pain in right foot  M79.671       5. Weakness generalized  R53.1              Therapy precautions:None  Co-morbidities affecting plan of care: H/o right hip injury requiring crutches and resultant altered gait pattern for about 1 month    Total Timed Procedure Codes: 40 min, Total Time: 55 min  Time In: 01:00 PM  Time Out: 01:55 PM    PERTINENT MEDICAL HISTORY     Past medical and surgical history:   No past medical history on file. No past surgical history on file. Medications: reviewed in chart   Allergies: No Known Allergies       Chief complaints/history of injury:  Patient is a 28 y.o. female with a PMH complicated as noted above. She presents to PT with c/o right lateral heel pain. Date symptoms began: Early April 2022  Yordan Morel of condition: Chronic (continuous duration > 3 months)  Primary cause of current episode: Unspecified  How did symptoms start: Insidious onset; denies new activities, repeated activities, or held positions   Describe current symptoms: Patient reports lateral heel pain when weightbearing through her RLE. Received previous therapy? No has failed injection, medication, pads and limited activity    Diagnostic exams (per chart review): per 10/5/2022 MD note  Right MRI scan plantar fasciitis, central and lateral band partial tears    SUBJECTIVE   Patient reports she was sore as expected following her last therapy session, however it resolved.         OBJECTIVE       Treatment provided today consisted of:  Ultrasound (85878) x 8 minutes to right abductor digiti mini and quadratus plantae, at 1 MHz, 50% duty cycle, 1.2 w/cm2 assisting in reducing pain, muscle spasm/soft tissue restrictions. Dry Needling (un-timed code) 25928: needle insertion(s) without injection(s); 1 or 2 muscle(s): Stimulating underlying myofascial trigger points, muscular and connective tissues for the management of neuromusculoskeletal pain and movement impairment   Patient was educated on dry needling treatment, expectations, and post-treatment instructions. Dry needling precautions/contraindications: Reviewed- none noted    Needles size and location: Right abductor digiti mini and quadratus plantae                                             Needle count: 2 needles inserted/ 2 needles removed   Position: Prone   Needle  technique left in situ x 5 minutes    Electrical Stimulation Not performed   Patient Response: Patient experienced no adverse response to treatment. Manual therapy (88267) x 8 min utilizing techniques to improve joint and/or soft tissue mobility, ROM, and function as well as helping to decrease pain/spasms and swelling. Palpation and assessment of soft tissue, muscles, and landmarks   IASTM to Right abductor digiti mini and quadratus plantae      Therapeutic exercise (47664) x 24 min to address ROM/strength deficits and to develop an initial HEP as noted below. Long sitting gastroc stretch x 30\"  Theraband ankle strengthening; PF + toe curl, DF +toe extension, EV, Inv x 30 each      ASSESSMENT   Patient required prolonged cryotherapy to tolerance dry needling to her right foot. Theraband exercises fatigued the patient most notably with resisted eversion. GOALS     Short term goals to be met by 11/29/2022  (4 weeks)  Patient will show good recall of their HEP requiring no more than minimal verbal cuing for proper form and technique. Increase AROM of involved ankle to be ? DF: 10°, PF: 45°, INV: 30°, EV: 10° to improve ability to walk with more normalized gait.    Patient will report pain no greater than 6/10 to improve standing

## 2022-11-08 ENCOUNTER — TELEPHONE (OUTPATIENT)
Dept: ORTHOPEDIC SURGERY | Age: 27
End: 2022-11-08

## 2022-11-09 ENCOUNTER — OFFICE VISIT (OUTPATIENT)
Dept: ORTHOPEDIC SURGERY | Age: 27
End: 2022-11-09
Payer: COMMERCIAL

## 2022-11-09 DIAGNOSIS — R26.2 DIFFICULTY IN WALKING: ICD-10-CM

## 2022-11-09 DIAGNOSIS — M79.671 PAIN IN RIGHT FOOT: ICD-10-CM

## 2022-11-09 DIAGNOSIS — Z74.09 DECREASED FUNCTIONAL MOBILITY AND ENDURANCE: ICD-10-CM

## 2022-11-09 DIAGNOSIS — R53.1 WEAKNESS GENERALIZED: ICD-10-CM

## 2022-11-09 DIAGNOSIS — M79.671 PAIN OF RIGHT HEEL: Primary | ICD-10-CM

## 2022-11-09 PROCEDURE — 97140 MANUAL THERAPY 1/> REGIONS: CPT | Performed by: PHYSICAL THERAPIST

## 2022-11-09 PROCEDURE — 97035 APP MDLTY 1+ULTRASOUND EA 15: CPT | Performed by: PHYSICAL THERAPIST

## 2022-11-09 PROCEDURE — 97016 VASOPNEUMATIC DEVICE THERAPY: CPT | Performed by: PHYSICAL THERAPIST

## 2022-11-09 PROCEDURE — 97110 THERAPEUTIC EXERCISES: CPT | Performed by: PHYSICAL THERAPIST

## 2022-11-09 NOTE — PROGRESS NOTES
[facility] [address] [address]  Dept: [phone]      Physical Therapy Daily Note     Referring MD: Hanane Luna MD  Diagnosis:     ICD-10-CM    1. Pain of right heel  M79.671       2. Decreased functional mobility and endurance  Z74.09       3. Difficulty in walking  R26.2       4. Pain in right foot  M79.671       5. Weakness generalized  R53.1                Therapy precautions:None  Co-morbidities affecting plan of care: H/o right hip injury requiring crutches and resultant altered gait pattern for about 1 month    Total Timed Procedure Codes: 43 min, Total Time: 62 min  Time In: 08:05 AM  Time Out: 09:07 AM    PERTINENT MEDICAL HISTORY     Past medical and surgical history:   No past medical history on file. No past surgical history on file. Medications: reviewed in chart   Allergies: No Known Allergies       Chief complaints/history of injury:  Patient is a 28 y.o. female with a PMH complicated as noted above. She presents to PT with c/o right lateral heel pain. Date symptoms began: Early April 2022  Angela  of condition: Chronic (continuous duration > 3 months)  Primary cause of current episode: Unspecified  How did symptoms start: Insidious onset; denies new activities, repeated activities, or held positions   Describe current symptoms: Patient reports lateral heel pain when weightbearing through her RLE. Received previous therapy? No has failed injection, medication, pads and limited activity    Diagnostic exams (per chart review): per 10/5/2022 MD note  Right MRI scan plantar fasciitis, central and lateral band partial tears    SUBJECTIVE   Patient reports 8/10 lateral plantar foot pain this morning that occurs when standing.       OBJECTIVE       Treatment provided today consisted of:  Ultrasound (80095) x 8 minutes to right abductor digiti mini and quadratus plantae, at 1 MHz, 50% duty cycle, 1.2 w/cm2  assisting in reducing pain, muscle spasm/soft tissue restrictions. Manual therapy (16040) x 15 min utilizing techniques to improve joint and/or soft tissue mobility, ROM, and function as well as helping to decrease pain/spasms and swelling. Palpation and assessment of soft tissue, muscles, and landmarks   IASTM/STM to Right abductor digiti mini, quadratus plantae, and gastrocnemius  Prone cuboid mobilization     Therapeutic exercise (99200) x 28 min to develop ROM, strength, endurance and flexibility in the RLE. Long sitting gastroc stretch x 30\"  Prone hip extensions with knee flexed to 90 degrees - 3x10  SDLY hip ABD - 3x10  Supine SLR - 3x10  Update HEP    Vasopneumatic Compression (28767) with cold x 15 minutes: to right ankle/foot in order to reduce inflammation and swelling/joint effusion which will help improve ROM and manage pain. ASSESSMENT   Patient reported 5/10 standing pain following manual therapy and exercises, therefore cryotherapy was performed. Patient required max verbal and tactile cuing for proper performance of hip exercises. Visible muscular fatigue present. She was issued a written copy of her updated HEP which she demonstrated with good form and technique. GOALS     Short term goals to be met by 11/29/2022  (4 weeks)  Patient will show good recall of their HEP requiring no more than minimal verbal cuing for proper form and technique. Increase AROM of involved ankle to be ? DF: 10°, PF: 45°, INV: 30°, EV: 10° to improve ability to walk with more normalized gait. Patient will report pain no greater than 6/10 to improve standing tolerance for ADLs and IADLs. Pt. will demonstrate standing posture with equal weight bearing through BLEs  Improve LEFS to ? 45/80. Patient will achieve an average score on the Patient-Specific Functional Scale ? 5/10 indicating improving functional mobility. Long term goals to be met by 12/27/2022  (8 weeks)  Increase AROM of involved ankle to be ?  DF: 15°, INV: 35°, EV: 15° to improve ability to walk with more normalized gait. Patient will report pain no greater than 3/10 to improve standing and walking tolerance for work duties. Pt. will improve SLS on firm surface to ? 10 seconds to be able to navigate over obstacles. Patient will achieve an average score on the Patient-Specific Functional Scale ? 7/10 indicating improving functional mobility. Improve LEFS to ? 55/80 allowing for improved Functional Deficit of 69% (Mobility). Discharged from PT     PLAN    Assess patient's tolerance to treatment and continue with generalized hip strengthening, cuboid mobilizations, and discontinue US. Access Code: Critical access hospital  URL: [email or web address]  Date: 11/09/2022  Prepared by: Gama Escobedo DPT    Exercises  Long Sitting Calf Stretch with Strap - 3-5 x daily - 7 x weekly - 1-2 sets - 30 seconds hold  Long Sitting Ankle PROM Inversion Eversion - 3-5 x daily - 7 x weekly - 1-2 sets - 30 seconds hold  Seated Toe Curl - 3-5 x daily - 7 x weekly - 3 sets - 10 reps  Towel Scrunches - 2 x daily - 7 x weekly - 3 sets - 10 reps  Toe Spreading - 3-5 x daily - 7 x weekly - 3 sets - 10 reps  Supine Ankle Inversion Eversion AROM - 3-5 x daily - 7 x weekly - 1-2 sets - 10 reps  Supine Ankle Pumps - 3-5 x daily - 7 x weekly - 1-2 sets - 10 reps  Supine Active Straight Leg Raise - 1 x daily - 5 x weekly - 3 sets - 10 reps - 1-2 seconds hold  Sidelying Hip Abduction - 1 x daily - 5 x weekly - 3 sets - 10 reps - 1-2 seconds hold  Prone Hip Extension with Bent Knee - 1 x daily - 5 x weekly - 3 sets - 10 reps - 1-2 seconds hold  Ice - 2 x daily - 7 x weekly - 15-20 minutes duration      ---------------------------    Access Code: Critical access hospital  URL: [email or web address]  Date: 11/01/2022  Prepared by: Gama Escobedo DPT    Exercises  Long Sitting Calf Stretch with Strap - 3-5 x daily - 7 x weekly - 1-2 sets - 30 seconds hold  Long Sitting Ankle PROM Inversion Eversion - 3-5 x daily - 7 x weekly - 1-2 sets - 30 seconds hold  Seated Toe Curl - 3-5 x daily - 7 x weekly - 3 sets - 10 reps  Toe Spreading - 3-5 x daily - 7 x weekly - 3 sets - 10 reps  Supine Ankle Inversion Eversion AROM - 3-5 x daily - 7 x weekly - 1-2 sets - 10 reps  Supine Ankle Pumps - 3-5 x daily - 7 x weekly - 1-2 sets - 10 reps  Ice - 2 x daily - 7 x weekly - 15-20 minutes duration      [employer]

## 2022-11-11 ENCOUNTER — OFFICE VISIT (OUTPATIENT)
Dept: ORTHOPEDIC SURGERY | Age: 27
End: 2022-11-11
Payer: COMMERCIAL

## 2022-11-11 DIAGNOSIS — M79.671 PAIN OF RIGHT HEEL: Primary | ICD-10-CM

## 2022-11-11 PROCEDURE — 99214 OFFICE O/P EST MOD 30 MIN: CPT | Performed by: ORTHOPAEDIC SURGERY

## 2022-11-11 PROCEDURE — L4396 STATIC OR DYNAMI AFO PRE CST: HCPCS | Performed by: ORTHOPAEDIC SURGERY

## 2022-11-11 RX ORDER — MELOXICAM 15 MG/1
15 TABLET ORAL DAILY
Qty: 30 TABLET | Refills: 0 | Status: SHIPPED | OUTPATIENT
Start: 2022-11-11

## 2022-11-11 NOTE — LETTER
DME Patient Authorization Form    Name: Desi Garcia  : 1995  MRN: [id number]   Age: 28 y.o. Gender: female  Delivery Address: [facility]     Diagnosis:     ICD-10-CM    1. Pain of right heel  M79.671 Night Splint Dorsal ()           Requested DME:  Night Splint Dorsal -  ($197.00) X 1 - right        Clinical Notes:     **Indicates non-covered items by insurance. Payment expected on date of service. Electronically signed by  Provider: Bay Squires MD__Date: 2022                            [facility]/[address] Tax ID # [id number]        Durable Medical Equipment and/or Orthotics Patient Consent     I understand that my physician has prescribed this medical supply as part of my treatment plan as a matter of Medical Necessity.  I understand that I have a choice in where I receive my prescribed orthopedic supplies and/or services.  I authorize [facility] to furnish this service/product and to provide my insurance carrier with any information requested in order to process for payment.  I instruct my insurance carrier to pay [facility] directly for these services/products.  I understand that my insurance carrier may deny payment for this supply because it is a non-covered item, deemed not medically necessary or considered experimental.   I understand that any cost not covered by my insurance carrier will be solely my financial responsibility.  I have received the Supplier Standards and have reviewed them.  I have received the prescribed item and have been fully instructed on the proper use of the above services/products.    ______ (Patient Initials) I understand that all DME items are non-returnable after being dispensed. Items still in sealed packaging may be returned up to 14 days after purchasing.  [address] [employer] will replace items that are defective.    ______ (Patient Initials) I understand that [facility] will not file a claim with my insurance carrier for this service/product and I am waiving my right to file a claim on my own for this service/product with my insurance company as this item is NON-COVERED (Denoted by the **) by my Insurance company/policy. ______ (Patient Initials) I understand that I am responsible to bring my equipment to the hospital for any surgery. ______________________________________________  ________________________  Patient / Shankar oJseph            Thank you for considering [address] Your physician has prescribed specific medical equipment or devices for your home use. The following describes your rights and responsibilities as our customer. Right to Choose Providers: You have a choice regarding which company supplies your home medical equipment and devices, and to consult your physician in this decision. You may choose a medical supply store, a home medical equipment provider, or a specialist such as [facility]. [facility] will coordinate with your physician to provide the medical equipment or devices prescribed for your home use. Right to Service:  You have the right to considerate, respectful and nondiscriminatory care. You have the right to receive accurate and easily understood information about your health care. If you speak a foreign language, or don't understand the discussions, assistance will be provided to allow you to make informed health care decisions. You have the right to know your treatment options and to participate in decisions about your care, including the right to accept or refuse treatment. You have the right to expect a reasonable response to your requests for treatment or service.   You have the right to talk in confidence with health care providers and to have your health care information protected. You have the right to receive an explanation of your bill. You have the right to complain about the service or product you receive. Patient Responsibilities:  Please provide complete and accurate information about your health insurance benefits and make arrangements for the timely payment of your bill. [facility] will, if possible, assume responsibility for billing your insurance (Medicare, Medicaid and commercial) for the prescribed equipment or devices. If your policy does not cover the prescribed product, or only covers a portion of the bill, you are responsible for any remaining balance. Return and Exchange Policy:  [facility] will honor published  Warranties for products. [facility] will accept returns or exchanges within 14 days from the date of receipt, providin) the product must be in new condition; 2) receipt as required; and 3) used disposable and hygiene products may only be returned due to a defective product. Note: Refunds will be issued in a timely manner, please allow 4-6 weeks for processing. Complaint Procedures and DME Consumer Protection Resources:  [facility] values you as a customer, and is committed to resolving patient concerns. This commitment includes understanding and documenting your concerns, conducting a review of internal procedures, and providing you with an explanation and resolution to your concerns. Should you have any questions about our services or billing process, please contact our office at (practice phone number). If we are unable to resolve the concern, you have the right to direct comments to the office of Consumer Protection, in the [address] or the [facility] office, without fear of repercussion. DMEPOS SUPPLIER STANDARDS    A supplier must be in compliance with all applicable Federal and [employer] and regulatory requirements.   A supplier must provide complete and accurate information on the DMEPOS supplier application. Any changes to this information must be reported to the [facility] within 30 days. An authorized individual (one whose signature is binding) must sign the application for billing privileges. A supplier must fill orders from its own inventory, or must contract with other companies for the purchase of items necessary to fill the order. A supplier may not contract with any entity that is currently excluded from the Medicare program, any [facility] program, or from any other Federal procurement or Nonprocurement programs. A supplier must advise beneficiaries that they may rent or purchase inexpensive or routinely purchased durable medical equipment, and of the purchase option for capped rental equipment. A supplier must notify beneficiaries of warranty coverage and honor all warranties under applicable State Law, and repair or replace free of charge Medicare covered items that are under warranty. A supplier must maintain a physical facility on an appropriate site. A supplier must permit CMS, or its agents to conduct on-site inspections to ascertain the supplier's compliance with these standards. The supplier location must be accessible to beneficiaries during reasonable business hours, and must maintain a visible sign and posted hours of operation. A supplier must maintain a primary business telephone listed under the name of the business in a [employer] or a toll free number available through directory assistance. The exclusive use of a beeper, answering machine or cell phone is prohibited. A supplier must have comprehensive liability insurance in the amount of at least $300,000 that covers both the supplier's place of business and all customers and employees of the supplier. If the supplier manufactures its own items, this insurance must also cover product liability and completed operations.   A supplier must agree not to initiate telephone contact with beneficiaries, with a few exceptions allowed. This standard prohibits suppliers from calling beneficiaries in order to solicit new business. A supplier is responsible for delivery and must instruct beneficiaries on use of Medicare covered items, and maintain proof of delivery. A supplier must answer questions, and respond to complaints of the beneficiaries, and maintain documentation of such contacts. A supplier must maintain and replace at no charge or repair directly, or through a service contract with another company, Medicare covered items it has rented to beneficiaries. A supplier must accept returns of substandard (less than full quality for the particular item) or unsuitable items (inappropriate for the beneficiary at the time it was fitted and rented or sold) from beneficiaries. A supplier must disclose these supplier standards to each beneficiary to whom it supplies a Medicare-covered item. A supplier must disclose to the government any person having ownership, financial, or control interest in the supplier. A supplier must not convey or reassign a supplier number; i.e., the supplier may not sell or allow another entity to use its Medicare billing number. A supplier must have a complaint resolution protocol established to address beneficiary complaints that relate to these standards. A record of these complaints must be maintained at the physical facility. Complaint records must include: the name, address, telephone number and health insurance claim number of the beneficiary, a summary of the complaint, and any action taken to resolve it. A supplier must agree to furnish CMS any information required by the Medicare statute and implementing regulations. A supplier of DMEPOS and other items and services must be accredited by a CMS-approved accreditation organization in order to receive and retain a supplier billing number.  The accreditation must indicate the specific products and services, for which the supplier is accredited in order for the supplier to receive payment for those specific products and services. A DMEPOS supplier must notify their accreditation organization when a new DMEPOS location is opened. The accreditation organization may accredit the new supplier location for three months after it is operational without requiring a new site visit. All DMEPOS supplier locations, whether owned or subcontracted, must meet the [employer] and be separately accredited in order to bill Medicare. An accredited supplier may be denied enrollment or their enrollment may be revoked, if CMS determines that they are not in compliance with the DMEPOS quality standards. A DMEPOS supplier must disclose upon enrollment all products and services, including the addition of new product lines for which they are seeking accreditation. If a new product line is added after enrollment, the DMEPOS supplier will be responsible for notifying the accrediting body of the new product so that the DMEPOS supplier can be re-surveyed and accredited for these new products. Must meet the surety bond requirements specified in 42 C. F.R. 424.57(c). Implementation date- May 4, 2009. A supplier must obtain oxygen from a state-licensed oxygen supplier. A supplier must maintain ordering and referring documentation consistent with provisions found in 42 C. F.R. 424.516(f). DMEPOS suppliers are prohibited from sharing a practice location with certain other Medicare providers and suppliers. DMEPOS suppliers must remain open to the public for a minimum of 30 hours per week with certain exceptions.

## 2022-11-11 NOTE — LETTER
[facility]  [address] [facility],[address]  [address] [place] [address] Way [address]  Dept: [phone]  Dept Fax: [phone] Carla Friend [phone]          RETURN TO WORK STATUS  11/11/2022    Diagnosis (optional ):    These are your On[employer] Instructions. It may contain personal and confidential  information about your health. It is up to you to share  these instructions as necessary with your employer(s) as required by their policies for you to return to work.     WORK STATUS:   {[employer] [facility] WORK EVQOVW:[id number]}    (PLEASE NOTE: If modified work is not available, this patient is then unable to work for this period of time.)           [employer]  [address]

## 2022-11-11 NOTE — PROGRESS NOTES
Patient was prescribed a Night splint for the patient's right foot. The patient wears a size 9 shoe and I fitted the patient with a L size night splint. Additionally, I instructed the patient on proper use and care of device as well as ensuring patient could safely get device on and off. I explained to the patient that wearing the splint, the foot is held in a certain position, called dorsiflexion. This means that the fascia is stretched, not allowing it to contract and become tighter overnight. The great thing about a night splint is that the remedy is quite gentle and the fascia will be returned to its proper length over a period of time. Once stretched out, the plantar fascia will become less tense and therefore will cause less pain. Patient read and signed documenting they understand and agree to [facility]'s current DME return policy.

## 2022-11-11 NOTE — LETTER
[address] [address]  Phone: [phone]  Fax: [phone]    Dante Essex, MD    November 11, 2022     [place] Moncho Curry  No address on file    Patient: Hermelinda Alonso   MR Number: [id number]   YOB: 1995   Date of Visit: 11/11/2022       To Whom It May Concern: It is my medical opinion that Hermelinda Alonso have sitting work only for 2 weeks after returning to work. If you have any questions or concerns, please don't hesitate to call.     Sincerely,        Dante Essex, MD

## 2022-11-11 NOTE — LETTER
[address] [address]  Phone: [phone]  Fax: [phone]    Joel Myles MD        November 11, 2022     Patient: [facility]   YOB: 1995   Date of Visit: 11/11/2022       To Whom It May Concern: It is my medical opinion that [address] have sitting work only for one month. If you have any questions or concerns, please don't hesitate to call.     Sincerely,        Joel Myles MD

## 2022-11-11 NOTE — PROGRESS NOTES
Name: Skip Augustine  YOB: 1995  Gender: female  MRN: [id number]    Summary: Right plantars fasciitis. Continue physical therapy and night splint and meloxicam.    She is already completed CAM Walker boot, steroid injections, alternative medications, homeopathic remedies, air heel brace, inserts, gel heel cups. Pain has been going on for 6 consecutive months. She is currently in therapy. MRI confirms plantars fasciitis    If not improved at next visit consider Randal. CC: Right heel pain    HPI: Skip Augustine is a 28 y.o. female presents today with medial arch and plantar heel/foot pain. The pain is a tearing burning and sharp sensation stabbing them in the heel. It is aggravated worse in the morning when they are getting out of bed are once they have been sitting around and try to stand back up. They state the morning time is the worst time and then throughout the day it is still very painful. She states this began in May 2022. She has been treated in our [address] [place] [address] since then with home stretching, CAM Walker boot, steroid injections, homeopathic medicines, air heel brace, inserts, heel cups. She is also taking over-the-counter nonsteroidals. She has tried ice bottle and rest.  Despite all this she continues to hurt. ROS/Meds/PSH/PMH/FH/SH: I personally reviewed the patients standard intake form. Below are the pertinents    Tobacco:  reports that she has never smoked. She has never used smokeless tobacco.  Diabetes: None  Other: none    Physical Examination:  Right Lower Extremity: FROM actively of toes, foot, ankle, knee and hip. No instability of foot or ankle with drawer and stress. 55 strength to TAEHLGSCPeronealsPTib. No skin lesions. TTP at the medial calcaneal insertion of the plantar fascia. This spot is very painful.   Gastroc Contracture noted on silverskoid exam: With the hindfoot in neutral and forefoot supinated ankle dorsiflexion is diminished with knee in extension when compared to the knee at 90deg  Neutral hindfoot alignment. No cavovarus nor planovalgus foot deformity  Talar tilt exam : normal  Anterior drawer exam w/ ankle plantarflexed at 20 deg: normal    Neuro:  normal SILT to s/s/sp/dp/t. Reflexes normal: 1+ patella reflex bilaterally, 1+ achilles reflex bilaterally, negative babinski bilaterally. no signs of hyper reflexia or absent reflex    Vascular: radial=4/4, femoral=4/4, popliteal=4/4, dorsalis pedis=4/4,     Imaging:       MRI Result (most recent):  MRI FOOT RIGHT WO CONTRAST 10/03/2022    Narrative  EXAMINATION: MRI FOOT RIGHT WO CONTRAST 10/3/2022 3:25 PM    ACCESSION NUMBER: [id number]    COMPARISON: Right foot x-ray May 1622    INDICATION: Pain in right foot . Assess plantar lateral calcaneal painful  calcaneal periostitis, abductor tendinitis, possible fascial tear    TECHNIQUE: Dedicated MRI of the right hindfoot/ankle was performed with  multiplanar multiecho technique. FINDINGS:  Ligaments  Interosseous: Intact  Medial: Intact  Lateral: Intact    Tendons  Achilles: Intact  Flexor: Intact  Peroneal: Intact  Extensor: Intact    Bones and articular cartilage: No evidence of acute fracture. Bone marrow signal  intensity is within normal limits. Talar dome is intact without focal chondral  defect. Tarsal tunnel: Normal    Sinus Tarsi: Normal    Plantar fascia: There is thickening and intermediate signal predominantly of the  lateral band of the plantar fascia with lesser involvement of the central band. There is small low-grade partial-thickness tearing of the lateral band of the  plantar fascia. Moderate edema extending deep to the plantar fascia and with  minimal edema at the plantar calcaneus.     Impression  Plantar fasciitis most prominently involving the lateral band and to lesser  extent of the central band with low-grade partial-thickness tearing of the  lateral band, moderate edema deep to the plantar fascia, and minimal edema  within the plantar calcaneus. Assessment:   Right plantars fasciitis     Plan:   4 This is a chronic illness/condition with exacerbation and progression  Treatment at this time: Physical Therapy and Prescription Drug Management, night splint  Studies ordered: NO XR needed @ Next Visit    Weight-bearing status: WBAT        Return to work/work restrictions: none  Mobic 15mg p.o. qday x 14 days: An Rx was given. We discussed the use of Mobic. I advised not to combine it with other NSAIDS such as advil, motrin, nor aleve. I discussed Mobic and its affect on the GI system, its risk of ulcer formation/exacerbation. I also discussed its affects on the kidneys and risk of nephritis and kidney damage. We discussed how it can alter your blood coagulability and limit platelet function, its negative affect on coronary artery disease, and how excessive alcohol use with Mobic can make all these problems worse. Angela Tan MD    I discussed with her that she has not completed all nonoperative treatment. I think is important we exhaust nonoperative treatments. She should complete her current regimen of physical therapy. She should start using a night splint. She continues to hurt her next visit we can discuss potential surgery with a Jessie.

## 2022-11-14 ENCOUNTER — OFFICE VISIT (OUTPATIENT)
Dept: ORTHOPEDIC SURGERY | Age: 27
End: 2022-11-14
Payer: COMMERCIAL

## 2022-11-14 DIAGNOSIS — R26.2 DIFFICULTY IN WALKING: ICD-10-CM

## 2022-11-14 DIAGNOSIS — M79.671 PAIN OF RIGHT HEEL: Primary | ICD-10-CM

## 2022-11-14 DIAGNOSIS — Z74.09 DECREASED FUNCTIONAL MOBILITY AND ENDURANCE: ICD-10-CM

## 2022-11-14 PROCEDURE — 97140 MANUAL THERAPY 1/> REGIONS: CPT | Performed by: PHYSICAL THERAPIST

## 2022-11-14 PROCEDURE — 97110 THERAPEUTIC EXERCISES: CPT | Performed by: PHYSICAL THERAPIST

## 2022-11-14 PROCEDURE — 97016 VASOPNEUMATIC DEVICE THERAPY: CPT | Performed by: PHYSICAL THERAPIST

## 2022-11-14 NOTE — PROGRESS NOTES
[facility] [address] [address]  Dept: [phone]      Physical Therapy Daily Note     Referring MD: Shadi Redman MD  Diagnosis:     ICD-10-CM    1. Pain of right heel  M79.671       2. Decreased functional mobility and endurance  Z74.09       3. Difficulty in walking  R26.2                Therapy precautions:None  Co-morbidities affecting plan of care: H/o right hip injury requiring crutches and resultant altered gait pattern for about 1 month    Total Timed Procedure Codes: 45 min, Total Time: 60 min  Time In: 02:30 PM  Time Out: 03:30 PM    PERTINENT MEDICAL HISTORY     Past medical and surgical history:   No past medical history on file. No past surgical history on file. Medications: reviewed in chart   Allergies: No Known Allergies       Chief complaints/history of injury:  Patient is a 28 y.o. female with a PMH complicated as noted above. She presents to PT with c/o right lateral heel pain. Date symptoms began: Early April 2022  Annabel Prim of condition: Chronic (continuous duration > 3 months)  Primary cause of current episode: Unspecified  How did symptoms start: Insidious onset; denies new activities, repeated activities, or held positions   Describe current symptoms: Patient reports lateral heel pain when weightbearing through her RLE. Received previous therapy? No has failed injection, medication, pads and limited activity    Diagnostic exams (per chart review): per 10/5/2022 MD note  Right MRI scan plantar fasciitis, central and lateral band partial tears    SUBJECTIVE   Patient reports her MD would like 2 more weeks of PT.      OBJECTIVE       Treatment provided today consisted of:    Manual therapy (66289) x 15 min utilizing techniques to improve joint and/or soft tissue mobility, ROM, and function as well as helping to decrease pain/spasms and swelling.   Palpation and assessment of soft tissue, muscles, and landmarks IASTM/STM to Right abductor digiti mini, quadratus plantae, and gastrocnemius  Prone cuboid mobilization     Therapeutic exercise (16606) x 30 min to develop ROM, strength, endurance and flexibility in the RLE. NuStep x 6', L4  Slantboard Gastroc Stretch - 3x30\"  Slantboard soleus stretch - 3x30\"  Standing heel raises - 2x10  3-way hip with orange R-loop around ankles - 3x10    Vasopneumatic Compression (71529) with cold x 15 minutes: to right ankle/foot in order to reduce inflammation and swelling/joint effusion which will help improve ROM and manage pain. ASSESSMENT   Initially performed heel raises in sitting however gradually progressed exercise intensity with weights and then eventually standing d/t patient reported little difficulty. Reported a strong stretch with slantboard exercises. GOALS     Short term goals to be met by 11/29/2022  (4 weeks)  Patient will show good recall of their HEP requiring no more than minimal verbal cuing for proper form and technique. Increase AROM of involved ankle to be ? DF: 10°, PF: 45°, INV: 30°, EV: 10° to improve ability to walk with more normalized gait. Patient will report pain no greater than 6/10 to improve standing tolerance for ADLs and IADLs. Pt. will demonstrate standing posture with equal weight bearing through BLEs  Improve LEFS to ? 45/80. Patient will achieve an average score on the Patient-Specific Functional Scale ? 5/10 indicating improving functional mobility. Long term goals to be met by 12/27/2022  (8 weeks)  Increase AROM of involved ankle to be ? DF: 15°, INV: 35°, EV: 15° to improve ability to walk with more normalized gait. Patient will report pain no greater than 3/10 to improve standing and walking tolerance for work duties. Pt. will improve SLS on firm surface to ? 10 seconds to be able to navigate over obstacles. Patient will achieve an average score on the Patient-Specific Functional Scale ?  7/10 indicating improving functional mobility. Improve LEFS to ? 55/80 allowing for improved Functional Deficit of 69% (Mobility). Discharged from [address] [place] [address] eccentric strengthening (tibialis anterior, gluteus medius, quadriceps)    Access Code: [facility]  URL: [email or web address]  Date: 11/09/2022  Prepared by: Jessee Vitale, DPT    Exercises  Long Sitting Calf Stretch with Strap - 3-5 x daily - 7 x weekly - 1-2 sets - 30 seconds hold  Long Sitting Ankle PROM Inversion Eversion - 3-5 x daily - 7 x weekly - 1-2 sets - 30 seconds hold  Seated Toe Curl - 3-5 x daily - 7 x weekly - 3 sets - 10 reps  Towel Scrunches - 2 x daily - 7 x weekly - 3 sets - 10 reps  Toe Spreading - 3-5 x daily - 7 x weekly - 3 sets - 10 reps  Supine Ankle Inversion Eversion AROM - 3-5 x daily - 7 x weekly - 1-2 sets - 10 reps  Supine Ankle Pumps - 3-5 x daily - 7 x weekly - 1-2 sets - 10 reps  Supine Active Straight Leg Raise - 1 x daily - 5 x weekly - 3 sets - 10 reps - 1-2 seconds hold  Sidelying Hip Abduction - 1 x daily - 5 x weekly - 3 sets - 10 reps - 1-2 seconds hold  Prone Hip Extension with Bent Knee - 1 x daily - 5 x weekly - 3 sets - 10 reps - 1-2 seconds hold  Ice - 2 x daily - 7 x weekly - 15-20 minutes duration      ---------------------------    Access Code: [facility]  URL: [email or web address]  Date: 11/01/2022  Prepared by: Jessee Vitale DPT    Exercises  Long Sitting Calf Stretch with Strap - 3-5 x daily - 7 x weekly - 1-2 sets - 30 seconds hold  Long Sitting Ankle PROM Inversion Eversion - 3-5 x daily - 7 x weekly - 1-2 sets - 30 seconds hold  Seated Toe Curl - 3-5 x daily - 7 x weekly - 3 sets - 10 reps  Toe Spreading - 3-5 x daily - 7 x weekly - 3 sets - 10 reps  Supine Ankle Inversion Eversion AROM - 3-5 x daily - 7 x weekly - 1-2 sets - 10 reps  Supine Ankle Pumps - 3-5 x daily - 7 x weekly - 1-2 sets - 10 reps  Ice - 2 x daily - 7 x weekly - 15-20 minutes duration      [employer]

## 2022-11-16 ENCOUNTER — OFFICE VISIT (OUTPATIENT)
Dept: ORTHOPEDIC SURGERY | Age: 27
End: 2022-11-16
Payer: COMMERCIAL

## 2022-11-16 ENCOUNTER — TELEPHONE (OUTPATIENT)
Dept: ORTHOPEDIC SURGERY | Age: 27
End: 2022-11-16

## 2022-11-16 DIAGNOSIS — Z74.09 DECREASED FUNCTIONAL MOBILITY AND ENDURANCE: ICD-10-CM

## 2022-11-16 DIAGNOSIS — M79.671 PAIN OF RIGHT HEEL: Primary | ICD-10-CM

## 2022-11-16 DIAGNOSIS — R26.2 DIFFICULTY IN WALKING: ICD-10-CM

## 2022-11-16 PROCEDURE — 97140 MANUAL THERAPY 1/> REGIONS: CPT | Performed by: PHYSICAL THERAPIST

## 2022-11-16 PROCEDURE — 97016 VASOPNEUMATIC DEVICE THERAPY: CPT | Performed by: PHYSICAL THERAPIST

## 2022-11-16 PROCEDURE — 97110 THERAPEUTIC EXERCISES: CPT | Performed by: PHYSICAL THERAPIST

## 2022-11-16 NOTE — PROGRESS NOTES
[facility] [address] [address]  Dept: [phone]      Physical Therapy Daily Note     Referring MD: Amanda Potter MD  Diagnosis:     ICD-10-CM    1. Pain of right heel  M79.671       2. Decreased functional mobility and endurance  Z74.09       3. Difficulty in walking  R26.2                Therapy precautions:None  Co-morbidities affecting plan of care: H/o right hip injury requiring crutches and resultant altered gait pattern for about 1 month    Total Timed Procedure Codes: 57 min, Total Time: 72 min  Time In: 08:00 AM  Time Out: 09:12 AM    PERTINENT MEDICAL HISTORY     Past medical and surgical history:   No past medical history on file. No past surgical history on file. Medications: reviewed in chart   Allergies: No Known Allergies       Chief complaints/history of injury:  Patient is a 28 y.o. female with a PMH complicated as noted above. She presents to PT with c/o right lateral heel pain. Date symptoms began: Early April 2022  Jessica Sales of condition: Chronic (continuous duration > 3 months)  Primary cause of current episode: Unspecified  How did symptoms start: Insidious onset; denies new activities, repeated activities, or held positions   Describe current symptoms: Patient reports lateral heel pain when weightbearing through her RLE. Received previous therapy? No has failed injection, medication, pads and limited activity    Diagnostic exams (per chart review): per 10/5/2022 MD note  Right MRI scan plantar fasciitis, central and lateral band partial tears    SUBJECTIVE   Patient reports she sleeps wild so her night splint does not stay on well. States the Oofos that she ordered has not yet come in and she only stretches at night d/t feeling sore.       OBJECTIVE     Treatment provided today consisted of:    Manual therapy (95056) x 15 min utilizing techniques to improve joint and/or soft tissue mobility, ROM, and function as well as helping to decrease pain/spasms and swelling. Palpation and assessment of soft tissue, muscles, and landmarks   IASTM/STM to Right abductor digiti mini, quadratus plantae, and gastrocnemius    Therapeutic exercise (34476) x 42 min to develop ROM, strength, endurance and flexibility in the RLE. NuStep x 6', L4  Slantboard Gastroc Stretch - 3x30\"  Slantboard soleus stretch - 3x30\"  Supine HSS with strap - 3x30\"  Eccentric PF, green TB x 30  Eccentric clams with LIGHT vergali x 30  Eccentric LAQs with orange R-loop x 30  Education: Footwear, Exercise frequency, cryotherapy with a frozen water bottle, night splint    Vasopneumatic Compression (58417) with cold x 15 minutes: to right ankle/foot in order to reduce inflammation and swelling/joint effusion which will help improve ROM and manage pain. ASSESSMENT   Patient expressed palpable tenderness at her quadratus plantae. We had an extended discussion on the importance of stretching multiple times per day, wearing proper footwear, getting a night splint that accommodates her sleeping pattern, and wearing supportive shoe inserts. Patient was issued a written copy of her updated HEP which she demonstrated with good form and technique. Footwear education included in the HEP. Tactile and verbal cuing provided to facilitate proper form and technique. GOALS     Short term goals to be met by 11/29/2022  (4 weeks)  Patient will show good recall of their HEP requiring no more than minimal verbal cuing for proper form and technique. Increase AROM of involved ankle to be ? DF: 10°, PF: 45°, INV: 30°, EV: 10° to improve ability to walk with more normalized gait. Patient will report pain no greater than 6/10 to improve standing tolerance for ADLs and IADLs. Pt. will demonstrate standing posture with equal weight bearing through BLEs  Improve LEFS to ? 45/80. Patient will achieve an average score on the Patient-Specific Functional Scale ? 5/10 indicating improving functional mobility. Long term goals to be met by 12/27/2022  (8 weeks)  Increase AROM of involved ankle to be ? DF: 15°, INV: 35°, EV: 15° to improve ability to walk with more normalized gait. Patient will report pain no greater than 3/10 to improve standing and walking tolerance for work duties. Pt. will improve SLS on firm surface to ? 10 seconds to be able to navigate over obstacles. Patient will achieve an average score on the Patient-Specific Functional Scale ? 7/10 indicating improving functional mobility. Improve LEFS to ? 55/80 allowing for improved Functional Deficit of 69% (Mobility). Discharged from PT     PLAN    Assess patient's tolerance to treatment and continue with current POC  as indicated. Access Code: [facility]  URL: [email or web address]  Date: 11/16/2022  Prepared by: Antwan Castro DPT    Exercises  Long Sitting Calf Stretch with Strap - 5 x daily - 7 x weekly - 3 sets - 30 seconds hold  Long Sitting Ankle PROM Inversion Eversion - 5 x daily - 7 x weekly - 3 sets - 30 seconds hold  Seated Great Toe Extension - 2 x daily - 7 x weekly - 3 sets - 10 reps - 10 seconds hold  Seated Lesser Toes Extension - 2 x daily - 7 x weekly - 3 sets - 10 reps - 10 seconds hold  Long Sitting Ankle Dorsiflexion with Anchored Resistance - 2 x daily - 7 x weekly - 1 sets - 30 reps - 4 seconds negative  Sitting Knee Extension with Resistance - 2 x daily - 7 x weekly - 1 sets - 30 reps - 4 seconds negative  Clam with Resistance - 2 x daily - 7 x weekly - 1 sets - 30 reps - 4 second negative  Ice - 2 x daily - 7 x weekly - 15-20 minutes duration    Patient Education  [employer]    -------------------------    Access Code: [facility]  URL: [email or web address]  Date: 11/09/2022  Prepared by: Antwan Castro DPT    Exercises  Long Sitting Calf Stretch with Strap - 3-5 x daily - 7 x weekly - 1-2 sets - 30 seconds hold  Long Sitting Ankle PROM Inversion Eversion - 3-5 x daily - 7 x weekly - 1-2 sets - 30 seconds hold  Seated Toe Curl - 3-5 x daily - 7 x weekly - 3 sets - 10 reps  Towel Scrunches - 2 x daily - 7 x weekly - 3 sets - 10 reps  Toe Spreading - 3-5 x daily - 7 x weekly - 3 sets - 10 reps  Supine Ankle Inversion Eversion AROM - 3-5 x daily - 7 x weekly - 1-2 sets - 10 reps  Supine Ankle Pumps - 3-5 x daily - 7 x weekly - 1-2 sets - 10 reps  Supine Active Straight Leg Raise - 1 x daily - 5 x weekly - 3 sets - 10 reps - 1-2 seconds hold  Sidelying Hip Abduction - 1 x daily - 5 x weekly - 3 sets - 10 reps - 1-2 seconds hold  Prone Hip Extension with Bent Knee - 1 x daily - 5 x weekly - 3 sets - 10 reps - 1-2 seconds hold  Ice - 2 x daily - 7 x weekly - 15-20 minutes duration      ---------------------------    Access Code: [facility]  URL: [email or web address]  Date: 11/01/2022  Prepared by: Jez Fonseca DPT    Exercises  Long Sitting Calf Stretch with Strap - 3-5 x daily - 7 x weekly - 1-2 sets - 30 seconds hold  Long Sitting Ankle PROM Inversion Eversion - 3-5 x daily - 7 x weekly - 1-2 sets - 30 seconds hold  Seated Toe Curl - 3-5 x daily - 7 x weekly - 3 sets - 10 reps  Toe Spreading - 3-5 x daily - 7 x weekly - 3 sets - 10 reps  Supine Ankle Inversion Eversion AROM - 3-5 x daily - 7 x weekly - 1-2 sets - 10 reps  Supine Ankle Pumps - 3-5 x daily - 7 x weekly - 1-2 sets - 10 reps  Ice - 2 x daily - 7 x weekly - 15-20 minutes duration      [employer]

## 2022-11-22 ENCOUNTER — OFFICE VISIT (OUTPATIENT)
Dept: ORTHOPEDIC SURGERY | Age: 27
End: 2022-11-22
Payer: COMMERCIAL

## 2022-11-22 DIAGNOSIS — R53.1 WEAKNESS GENERALIZED: ICD-10-CM

## 2022-11-22 DIAGNOSIS — M79.671 PAIN OF RIGHT HEEL: Primary | ICD-10-CM

## 2022-11-22 DIAGNOSIS — R26.2 DIFFICULTY IN WALKING: ICD-10-CM

## 2022-11-22 DIAGNOSIS — Z74.09 DECREASED FUNCTIONAL MOBILITY AND ENDURANCE: ICD-10-CM

## 2022-11-22 PROCEDURE — 97140 MANUAL THERAPY 1/> REGIONS: CPT | Performed by: PHYSICAL THERAPIST

## 2022-11-22 PROCEDURE — 97110 THERAPEUTIC EXERCISES: CPT | Performed by: PHYSICAL THERAPIST

## 2022-11-22 PROCEDURE — 97033 APP MDLTY 1+IONTPHRSIS EA 15: CPT | Performed by: PHYSICAL THERAPIST

## 2022-11-22 NOTE — PROGRESS NOTES
tissue, muscles, and landmarks   IASTM/STM to Right abductor digiti mini, quadratus plantae, and gastrocnemius    Therapeutic exercise (33101) x 37 min to develop ROM, strength, endurance and flexibility in the RLE. NuStep x 6', L4  Slantboard Gastroc Stretch - 3x30\"  Slantboard soleus stretch - 3x30\"  Seated Lg. BAPS; DF/PF, Pron/Supin, Cw/CcW x 10  Standing heel raises with a 4 count negative x 10    Iontophoresis (54117) x 10 min to deliver medication to plantar lateral right heel. Dosage: 40 mA.min, Medication: Ketoprofen. ASSESSMENT   We were unable to complete iontophoresis treatment however patient reported decreased plantar foot pain afterwards. She was reminded that she will need to always wear shoes to prevent aggravation of plantar foot pain. GOALS     Short term goals to be met by 11/29/2022  (4 weeks)  Patient will show good recall of their HEP requiring no more than minimal verbal cuing for proper form and technique. Increase AROM of involved ankle to be ? DF: 10°, PF: 45°, INV: 30°, EV: 10° to improve ability to walk with more normalized gait. Patient will report pain no greater than 6/10 to improve standing tolerance for ADLs and IADLs. Pt. will demonstrate standing posture with equal weight bearing through BLEs  Improve LEFS to ? 45/80. Patient will achieve an average score on the Patient-Specific Functional Scale ? 5/10 indicating improving functional mobility. Long term goals to be met by 12/27/2022  (8 weeks)  Increase AROM of involved ankle to be ? DF: 15°, INV: 35°, EV: 15° to improve ability to walk with more normalized gait. Patient will report pain no greater than 3/10 to improve standing and walking tolerance for work duties. Pt. will improve SLS on firm surface to ? 10 seconds to be able to navigate over obstacles. Patient will achieve an average score on the Patient-Specific Functional Scale ? 7/10 indicating improving functional mobility.   Improve LEFS to ? 55/80 allowing for improved Functional Deficit of 69% (Mobility). Discharged from PT     PLAN    Re-eval    Access Code: [address]: [email or web address]  Date: 11/16/2022  Prepared by: ANIKA HoltT    Exercises  Long Sitting Calf Stretch with Strap - 5 x daily - 7 x weekly - 3 sets - 30 seconds hold  Long Sitting Ankle PROM Inversion Eversion - 5 x daily - 7 x weekly - 3 sets - 30 seconds hold  Seated Great Toe Extension - 2 x daily - 7 x weekly - 3 sets - 10 reps - 10 seconds hold  Seated Lesser Toes Extension - 2 x daily - 7 x weekly - 3 sets - 10 reps - 10 seconds hold  Long Sitting Ankle Dorsiflexion with Anchored Resistance - 2 x daily - 7 x weekly - 1 sets - 30 reps - 4 seconds negative  Sitting Knee Extension with Resistance - 2 x daily - 7 x weekly - 1 sets - 30 reps - 4 seconds negative  Clam with Resistance - 2 x daily - 7 x weekly - 1 sets - 30 reps - 4 second negative  Ice - 2 x daily - 7 x weekly - 15-20 minutes duration    Patient Education  [employer]    -------------------------    Access Code: [facility]  URL: [email or web address]  Date: 11/09/2022  Prepared by: Renetta Power DPT    Exercises  Long Sitting Calf Stretch with Strap - 3-5 x daily - 7 x weekly - 1-2 sets - 30 seconds hold  Long Sitting Ankle PROM Inversion Eversion - 3-5 x daily - 7 x weekly - 1-2 sets - 30 seconds hold  Seated Toe Curl - 3-5 x daily - 7 x weekly - 3 sets - 10 reps  Towel Scrunches - 2 x daily - 7 x weekly - 3 sets - 10 reps  Toe Spreading - 3-5 x daily - 7 x weekly - 3 sets - 10 reps  Supine Ankle Inversion Eversion AROM - 3-5 x daily - 7 x weekly - 1-2 sets - 10 reps  Supine Ankle Pumps - 3-5 x daily - 7 x weekly - 1-2 sets - 10 reps  Supine Active Straight Leg Raise - 1 x daily - 5 x weekly - 3 sets - 10 reps - 1-2 seconds hold  Sidelying Hip Abduction - 1 x daily - 5 x weekly - 3 sets - 10 reps - 1-2 seconds hold  Prone Hip Extension with Bent Knee - 1 x daily - 5 x weekly - 3 sets - 10 reps - 1-2 seconds hold  Ice - 2 x daily - 7 x weekly - 15-20 minutes duration      ---------------------------    Access Code: [facility]  URL: [email or web address]  Date: 11/01/2022  Prepared by: Estephania Pereira DPT    Exercises  Long Sitting Calf Stretch with Strap - 3-5 x daily - 7 x weekly - 1-2 sets - 30 seconds hold  Long Sitting Ankle PROM Inversion Eversion - 3-5 x daily - 7 x weekly - 1-2 sets - 30 seconds hold  Seated Toe Curl - 3-5 x daily - 7 x weekly - 3 sets - 10 reps  Toe Spreading - 3-5 x daily - 7 x weekly - 3 sets - 10 reps  Supine Ankle Inversion Eversion AROM - 3-5 x daily - 7 x weekly - 1-2 sets - 10 reps  Supine Ankle Pumps - 3-5 x daily - 7 x weekly - 1-2 sets - 10 reps  Ice - 2 x daily - 7 x weekly - 15-20 minutes duration      [facility]

## 2022-11-29 ENCOUNTER — OFFICE VISIT (OUTPATIENT)
Dept: ORTHOPEDIC SURGERY | Age: 27
End: 2022-11-29
Payer: COMMERCIAL

## 2022-11-29 ENCOUNTER — OFFICE VISIT (OUTPATIENT)
Dept: ORTHOPEDIC SURGERY | Age: 27
End: 2022-11-29

## 2022-11-29 DIAGNOSIS — R26.2 DIFFICULTY IN WALKING: ICD-10-CM

## 2022-11-29 DIAGNOSIS — M79.671 PAIN OF RIGHT HEEL: Primary | ICD-10-CM

## 2022-11-29 DIAGNOSIS — Z74.09 DECREASED FUNCTIONAL MOBILITY AND ENDURANCE: ICD-10-CM

## 2022-11-29 DIAGNOSIS — R53.1 WEAKNESS GENERALIZED: ICD-10-CM

## 2022-11-29 DIAGNOSIS — M72.2 PLANTAR FASCIITIS: Primary | ICD-10-CM

## 2022-11-29 PROCEDURE — 97014 ELECTRIC STIMULATION THERAPY: CPT | Performed by: PHYSICAL THERAPIST

## 2022-11-29 PROCEDURE — 97110 THERAPEUTIC EXERCISES: CPT | Performed by: PHYSICAL THERAPIST

## 2022-11-29 NOTE — PROGRESS NOTES
Name: Fernanda Barragan  YOB: 1995  Gender: female  MRN: [id number]    Summary: Right plantars fasciitis. Continue physical therapy and night splint and meloxicam. Repeat injection into PF given today    She is already completed CAM Walker boot, steroid injections, alternative medications, homeopathic remedies, air heel brace, inserts, gel heel cups. Pain has been going on for 6 consecutive months. She is currently in therapy. MRI confirms plantars fasciitis    Potential Celia Saleh if ever needed. Follow-up in 2 months       CC: Right heel pain    HPI: Fernanda Barragan is a 28 y.o. female presents today with medial arch and plantar heel/foot pain. The pain is a tearing burning and sharp sensation stabbing them in the heel. It is aggravated worse in the morning when they are getting out of bed are once they have been sitting around and try to stand back up. They state the morning time is the worst time and then throughout the day it is still very painful. She states this began in May 2022. Review of notes reveals Dr. Mayelin Ng treated her in April, May, September, October, and November for this recurrent plantars fasciitis. She has been treated in our Mayelin Ng since then with home stretching, CAM Walker boot, steroid injections, homeopathic medicines, air heel brace, inserts, heel cups. She is also taking over-the-counter nonsteroidals. She has tried ice bottle and rest.  Despite all this she continues to hurt. 11/11/2022-first appointment. Plantars fasciitis diagnosis. At that visit we ordered with formal physical therapy, night splint, meloxicam    11/29/2022-she has been in physical therapy for 3 weeks. She still hurts but states she is making improvements. She states after therapy she notices an improvement but when she wakes up in the morning the pain returns. She has returned to work tomorrow.   She wants to know there is anything we can do today to help her have less pain so she can return to work. She states at this point she is not consider any type of surgery    ROS/Meds/PSH/PMH/FH/SH: I personally reviewed the patients standard intake form. Below are the pertinents    Tobacco:  reports that she has never smoked. She has never used smokeless tobacco.  Diabetes: None  Other: none    Physical Examination:  Right Lower Extremity: FROM actively of toes, foot, ankle, knee and hip. No instability of foot or ankle with drawer and stress. 55 strength to TAEHLGSCPeronealsPTib. No skin lesions. TTP at the medial calcaneal insertion of the plantar fascia. This spot is very painful. Gastroc Contracture noted on silverskoid exam: With the hindfoot in neutral and forefoot supinated ankle dorsiflexion is diminished with knee in extension when compared to the knee at 90deg  Neutral hindfoot alignment. No cavovarus nor planovalgus foot deformity  Talar tilt exam : normal  Anterior drawer exam w/ ankle plantarflexed at 20 deg: normal    Neuro:  normal SILT to s/s/sp/dp/t. Reflexes normal: 1+ patella reflex bilaterally, 1+ achilles reflex bilaterally, negative babinski bilaterally. no signs of hyper reflexia or absent reflex    Vascular: radial=4/4, femoral=4/4, popliteal=4/4, dorsalis pedis=4/4,     Imaging:       MRI Result (most recent):  MRI FOOT RIGHT WO CONTRAST 10/03/2022    Narrative  EXAMINATION: MRI FOOT RIGHT WO CONTRAST 10/3/2022 3:25 PM    ACCESSION NUMBER: [id number]    COMPARISON: Right foot x-ray May 1622    INDICATION: Pain in right foot . Assess plantar lateral calcaneal painful  calcaneal periostitis, abductor tendinitis, possible fascial tear    TECHNIQUE: Dedicated MRI of the right hindfoot/ankle was performed with  multiplanar multiecho technique.     FINDINGS:  Ligaments  Interosseous: Intact  Medial: Intact  Lateral: Intact    Tendons  Achilles: Intact  Flexor: Intact  Peroneal: Intact  Extensor: Intact    Bones and articular cartilage: No evidence of acute fracture. Bone marrow signal  intensity is within normal limits. Talar dome is intact without focal chondral  defect. Tarsal tunnel: Normal    Sinus Tarsi: Normal    Plantar fascia: There is thickening and intermediate signal predominantly of the  lateral band of the plantar fascia with lesser involvement of the central band. There is small low-grade partial-thickness tearing of the lateral band of the  plantar fascia. Moderate edema extending deep to the plantar fascia and with  minimal edema at the plantar calcaneus. Impression  Plantar fasciitis most prominently involving the lateral band and to lesser  extent of the central band with low-grade partial-thickness tearing of the  lateral band, moderate edema deep to the plantar fascia, and minimal edema  within the plantar calcaneus. I independently reviewed his MRI and agree with the diagnosis of plantars fasciitis most prominently over the lateral band plantar fascia. Assessment:   Right plantars fasciitis     Plan:   4 This is a chronic illness/condition with exacerbation and progression  Treatment at this time: Physical Therapy and Minor Procedure: Injection done today: The patient understands the risks and complications associated with injection. After sterile prep of the area, the Right plantar fascia was injected with 1 cc. of Xylocaine and 1 cc. of steroid. . 40mg Depo Medrol was the steroid used. Patient tolerated it well. I discussed the risk of infection and skin blanching. I told the be patient be careful about the symptoms of hyperglycemia such as GI distress, polyuria, excessive thirst and lethargy. If these symptoms occur they should present to an primary care doctor or urgent facility, night splint  Studies ordered: NO XR needed @ Next Visit    Weight-bearing status: WBAT        Return to work/work restrictions: none  No medications given    Adriel No MD    She is improving with nonoperative treatment.   Physical therapy is making a difference. We gave her cortisone injection today to see if we can calm this down. I want her to complete 6 consecutive weeks of therapy before making decisions.

## 2022-11-29 NOTE — PROGRESS NOTES
[facility] [address] [address]  Dept: [phone]      Physical Therapy Daily Note     Referring MD: Lieutenant Jaylan MD  Diagnosis:     ICD-10-CM    1. Pain of right heel  M79.671       2. Decreased functional mobility and endurance  Z74.09       3. Difficulty in walking  R26.2       4. Weakness generalized  R53.1                  Therapy precautions:None  Co-morbidities affecting plan of care: H/o right hip injury requiring crutches and resultant altered gait pattern for about 1 month    Total Timed Procedure Codes: 43 min, Total Time: 58 min  Time In: 09:36 AM  Time Out: 10:34 AM    PERTINENT MEDICAL HISTORY     Past medical and surgical history:   No past medical history on file. No past surgical history on file. Medications: reviewed in chart   Allergies: No Known Allergies       Chief complaints/history of injury:  Patient is a 28 y.o. female with a PMH complicated as noted above. She presents to PT with c/o right lateral heel pain. Date symptoms began: Early April 2022  Conchis Geller of condition: Chronic (continuous duration > 3 months)  Primary cause of current episode: Unspecified  How did symptoms start: Insidious onset; denies new activities, repeated activities, or held positions   Describe current symptoms: Patient reports lateral heel pain when weightbearing through her RLE. Received previous therapy? No has failed injection, medication, pads and limited activity    Diagnostic exams (per chart review): per 10/5/2022 MD note  Right MRI scan plantar fasciitis, central and lateral band partial tears    SUBJECTIVE   Patient reports MD wants her to continue PT for another 6 weeks and gave her an cortisone injection. Patient reports 9/10 pain over the last 24 hours.   States she was doing a lot walking in tennis shoes with Dr. Lenward Gosselin inserts    RE-EVAL:    Conchis Geller of condition: Chronic (continuous duration > 3 months)  Describe current symptoms: Right lateral heel pain with prolonged standing and walking. Patient has not acquired the recommended shoes or inserts at this time and continue to present to therapy sessions in slides. Pain Assessment:  Pain location: Right lateral heel   Average Pain/symptom intensity (0-10 scale)  Last 24 hours: 9/10  Last week (1-7 days): 6/10  How often do you feel symptoms? Frequently (51-75%)    How much have your symptoms interfered with daily activities? Quite a bit  How has your condition changed since receiving care at this facility? A little better  In general, would you say your current overall health is fair     Functional Outcome Measures:   LEFS 11/01/2022 11/29/2022    Score 34/80 37/80   % Function 43% 46%         Outcome Measure: The Patient-Specific Functional Scale: Activity: 11/29/2022 Comments   1. Stair negotiation to enter/exit apartment 5/10 Slow pace   2. Improve ambulation tolerance for work 6/10 Limited to 3 hours   3. Perform floor to waist lifts for work duties 5/10 Unable to lift heavy weights   Average score:  5.33/10      Interpretation of Score: The Patient-Specific functional scale is used to quantify activity limitation and measure functional outcome for patients with any orthopaedic condition. Each activity is scored 0-10, 0 representing unable to perform activity and 10 able to perform activity at the same level as before injury or problem. Minimum detectable change is 2 points for average score and 3 points for single activity score.            AROM Ankle   RIGHT LEFT 11/29/2022 Quality of movement   Dorsiflexion: 4° 16° 8°      Plantar flexion: 40° 45° 44°     Inversion: 24° 40° 38°     Eversion: 6° 14° 10°           MMT: Right Left 11/29/2022 Quality of Testing   Dorsiflexion 3/5 5/5 4/5      Plantarflexion 4/5 5/5 4/5     Inversion 2+/5 5/5 4/5  Concomitant pain on right   Eversion 2+/5 5/5 4/5          OBJECTIVE     Treatment provided today consisted of:    Therapeutic exercise (80343) x 43 min to develop ROM, strength, endurance and flexibility in the RLE. NuStep x 6', L4  Re-eval  Long sitting Gastroc Stretch - 3x30\"  Long sitting soleus stretch - 3x30\"  Seated Lg. BAPS; DF/PF, Pron/Supin, Cw/CcW x 10  Standing heel raises with a 4 count negative x 10    Unattended electrical stimulation (17600/) with cryotherapy x 15 minutes to right plantar heel in order to reduce pain and muscle spasms associated with increased activities in therapy as well as help reduce delayed pain episodes. ASSESSMENT   Patient has completed 4 weeks of PT.  EOC includes 0 CXL and 0 no shows. She has progressed slower than expected  with treatment meeting 1 of 6 STGs at this time. She has subjective reports of Frequent moderate to severe pain and has decreased her functional deficit by 3%. Objective findings revealed improve right ankle ROM and strength holds against moderate resistance. Overall deficits include:  Pain, Abnormal Ambulation, and Decreased Endurance  These impairments interfere with the patient's ability to:  Perform Work Duties, Perform Home Duties, Safely Ambulate, and Participate in Recreation Activities  Continued skilled PT is recommended to address the above impairments and continue progress towards remaining therapy goals. Treatment modified today to hip strengthening and re-eval as patient is just coming from MD f/u and had a cortisone injection. GOALS     Short term goals to be met by 2022  (4 weeks)  Patient will show good recall of their HEP requiring no more than minimal verbal cuing for proper form and technique. - NOT MET: 22    Increase AROM of involved ankle to be ? DF: 10°, PF: 45°, INV: 30°, EV: 10° to improve ability to walk with more normalized gait.   - PROGRESSIN22 - missed in DF    Patient will report pain no greater than 6/10 to improve standing tolerance for ADLs and IADLs. - NOT MET: 22   Pt. will demonstrate standing posture with equal weight bearing through BLEs - PROGRESSIN22   Improve LEFS to ? 45/80. - NOT MET: 22   Patient will achieve an average score on the Patient-Specific Functional Scale ? 5/10 indicating improving functional mobility. - MET: 22     Long term goals to be met by 2022  (8 weeks)  Increase AROM of involved ankle to be ? DF: 15°, INV: 35°, EV: 15° to improve ability to walk with more normalized gait. Patient will report pain no greater than 3/10 to improve standing and walking tolerance for work duties. Pt. will improve SLS on firm surface to ? 10 seconds to be able to navigate over obstacles. Patient will achieve an average score on the Patient-Specific Functional Scale ? 7/10 indicating improving functional mobility. Improve LEFS to ? 55/80 allowing for improved Functional Deficit of 69% (Mobility). Discharged from PT     PLAN    Assess hip strength, resume ankle strengthening, and strengthening of foot intrinsics; resume iontophoresis    Access Code: [facility]  URL: [email or web address]  Date: 2022  Prepared by: Ariadna Mata DPT    Exercises  Long Sitting Calf Stretch with Strap - 5 x daily - 7 x weekly - 3 sets - 30 seconds hold  Long Sitting Ankle PROM Inversion Eversion - 5 x daily - 7 x weekly - 3 sets - 30 seconds hold  Seated Great Toe Extension - 2 x daily - 7 x weekly - 3 sets - 10 reps - 10 seconds hold  Seated Lesser Toes Extension - 2 x daily - 7 x weekly - 3 sets - 10 reps - 10 seconds hold  Long Sitting Ankle Dorsiflexion with Anchored Resistance - 2 x daily - 7 x weekly - 1 sets - 30 reps - 4 seconds negative  Sitting Knee Extension with Resistance - 2 x daily - 7 x weekly - 1 sets - 30 reps - 4 seconds negative  Clam with Resistance - 2 x daily - 7 x weekly - 1 sets - 30 reps - 4 second negative  Ice - 2 x daily - 7 x weekly - 15-20 minutes duration    Patient Education  [employer]    -------------------------    Access Code: [facility]  URL: [email or web address]  Date: 11/09/2022  Prepared by: Nelly Carroll DPT    Exercises  Long Sitting Calf Stretch with Strap - 3-5 x daily - 7 x weekly - 1-2 sets - 30 seconds hold  Long Sitting Ankle PROM Inversion Eversion - 3-5 x daily - 7 x weekly - 1-2 sets - 30 seconds hold  Seated Toe Curl - 3-5 x daily - 7 x weekly - 3 sets - 10 reps  Towel Scrunches - 2 x daily - 7 x weekly - 3 sets - 10 reps  Toe Spreading - 3-5 x daily - 7 x weekly - 3 sets - 10 reps  Supine Ankle Inversion Eversion AROM - 3-5 x daily - 7 x weekly - 1-2 sets - 10 reps  Supine Ankle Pumps - 3-5 x daily - 7 x weekly - 1-2 sets - 10 reps  Supine Active Straight Leg Raise - 1 x daily - 5 x weekly - 3 sets - 10 reps - 1-2 seconds hold  Sidelying Hip Abduction - 1 x daily - 5 x weekly - 3 sets - 10 reps - 1-2 seconds hold  Prone Hip Extension with Bent Knee - 1 x daily - 5 x weekly - 3 sets - 10 reps - 1-2 seconds hold  Ice - 2 x daily - 7 x weekly - 15-20 minutes duration      ---------------------------    Access Code: [facility]  URL: [email or web address]  Date: 11/01/2022  Prepared by: Nelly Carroll DPT    Exercises  Long Sitting Calf Stretch with Strap - 3-5 x daily - 7 x weekly - 1-2 sets - 30 seconds hold  Long Sitting Ankle PROM Inversion Eversion - 3-5 x daily - 7 x weekly - 1-2 sets - 30 seconds hold  Seated Toe Curl - 3-5 x daily - 7 x weekly - 3 sets - 10 reps  Toe Spreading - 3-5 x daily - 7 x weekly - 3 sets - 10 reps  Supine Ankle Inversion Eversion AROM - 3-5 x daily - 7 x weekly - 1-2 sets - 10 reps  Supine Ankle Pumps - 3-5 x daily - 7 x weekly - 1-2 sets - 10 reps  Ice - 2 x daily - 7 x weekly - 15-20 minutes duration      [employer]

## 2022-12-02 ENCOUNTER — OFFICE VISIT (OUTPATIENT)
Dept: ORTHOPEDIC SURGERY | Age: 27
End: 2022-12-02

## 2022-12-02 DIAGNOSIS — R26.2 DIFFICULTY IN WALKING: ICD-10-CM

## 2022-12-02 DIAGNOSIS — M79.671 PAIN OF RIGHT HEEL: Primary | ICD-10-CM

## 2022-12-02 DIAGNOSIS — R53.1 WEAKNESS GENERALIZED: ICD-10-CM

## 2022-12-02 DIAGNOSIS — Z74.09 DECREASED FUNCTIONAL MOBILITY AND ENDURANCE: ICD-10-CM

## 2022-12-02 NOTE — PROGRESS NOTES
[facility] [address] [address]  Dept: [phone]      Physical Therapy Daily Note     Referring MD: Argelia Jones MD  Diagnosis:     ICD-10-CM    1. Pain of right heel  M79.671       2. Decreased functional mobility and endurance  Z74.09       3. Difficulty in walking  R26.2       4. Weakness generalized  R53.1                Therapy precautions:None  Co-morbidities affecting plan of care: H/o right hip injury requiring crutches and resultant altered gait pattern for about 1 month    Total Timed Procedure Codes: 45 min, Total Time: 60 min  Time In: 03:20 PM  Time Out: 04:20 PM    PERTINENT MEDICAL HISTORY     Past medical and surgical history:   No past medical history on file. No past surgical history on file. Medications: reviewed in chart   Allergies: No Known Allergies       Chief complaints/history of injury:  Patient is a 28 y.o. female with a PMH complicated as noted above. She presents to PT with c/o right lateral heel pain. Date symptoms began: Early April 2022  Kalpana Cee of condition: Chronic (continuous duration > 3 months)  Primary cause of current episode: Unspecified  How did symptoms start: Insidious onset; denies new activities, repeated activities, or held positions   Describe current symptoms: Patient reports lateral heel pain when weightbearing through her RLE. Received previous therapy? No has failed injection, medication, pads and limited activity    Diagnostic exams (per chart review): per 10/5/2022 MD note  Right MRI scan plantar fasciitis, central and lateral band partial tears    SUBJECTIVE   Patient reports her foot felt better in terms of walker yesterday. OBJECTIVE     Treatment provided today consisted of: Therapeutic exercise (93645) x 45 min to develop ROM, strength, endurance and flexibility in the RLE.   NuStep x 6', L4  Slantboard Gastroc Stretch - 3x30\"  Slantboard soleus stretch - 3x30\"  Long sitting HSS stretch - 3x30\"  Seated arching - 15x3\"  Great toe Raise x 30  Great toe Curl, green TB x 30  Seated unilateral heel, 30# x 30    Unattended electrical stimulation (33472/) with cryotherapy x 15 minutes to right plantar heel in order to reduce pain and muscle spasms associated with increased activities in therapy as well as help reduce delayed pain episodes. ASSESSMENT   Patient displayed difficulty coordinating great toe AROM however improved with continued exercises. Reported no exacerbation of concordant symptoms during treatment today. GOALS     Short term goals to be met by 2022  (4 weeks)  Patient will show good recall of their HEP requiring no more than minimal verbal cuing for proper form and technique. - NOT MET: 22    Increase AROM of involved ankle to be ? DF: 10°, PF: 45°, INV: 30°, EV: 10° to improve ability to walk with more normalized gait. - PROGRESSIN22 - missed in DF    Patient will report pain no greater than 6/10 to improve standing tolerance for ADLs and IADLs. - NOT MET: 22   Pt. will demonstrate standing posture with equal weight bearing through BLEs - PROGRESSIN22   Improve LEFS to ? 45/80. - NOT MET: 22   Patient will achieve an average score on the Patient-Specific Functional Scale ? 5/10 indicating improving functional mobility. - MET: 22     Long term goals to be met by 2022  (8 weeks)  Increase AROM of involved ankle to be ? DF: 15°, INV: 35°, EV: 15° to improve ability to walk with more normalized gait. Patient will report pain no greater than 3/10 to improve standing and walking tolerance for work duties. Pt. will improve SLS on firm surface to ? 10 seconds to be able to navigate over obstacles. Patient will achieve an average score on the Patient-Specific Functional Scale ? 7/10 indicating improving functional mobility.   Improve LEFS to ? 55/80 allowing for improved Functional Deficit of 69% (Mobility). Discharged from PT     PLAN    Assess hip strength, resume ankle strengthening, and strengthening of foot intrinsics; resume iontophoresis    Access Code: [facility]  URL: [email or web address]  Date: 11/16/2022  Prepared by: Alesia Aldridge DPT    Exercises  Long Sitting Calf Stretch with Strap - 5 x daily - 7 x weekly - 3 sets - 30 seconds hold  Long Sitting Ankle PROM Inversion Eversion - 5 x daily - 7 x weekly - 3 sets - 30 seconds hold  Seated Great Toe Extension - 2 x daily - 7 x weekly - 3 sets - 10 reps - 10 seconds hold  Seated Lesser Toes Extension - 2 x daily - 7 x weekly - 3 sets - 10 reps - 10 seconds hold  Long Sitting Ankle Dorsiflexion with Anchored Resistance - 2 x daily - 7 x weekly - 1 sets - 30 reps - 4 seconds negative  Sitting Knee Extension with Resistance - 2 x daily - 7 x weekly - 1 sets - 30 reps - 4 seconds negative  Clam with Resistance - 2 x daily - 7 x weekly - 1 sets - 30 reps - 4 second negative  Ice - 2 x daily - 7 x weekly - 15-20 minutes duration    Patient Education  [employer]    -------------------------    Access Code: [facility]  URL: [email or web address]  Date: 11/09/2022  Prepared by: Alesia Aldridge DPT    Exercises  Long Sitting Calf Stretch with Strap - 3-5 x daily - 7 x weekly - 1-2 sets - 30 seconds hold  Long Sitting Ankle PROM Inversion Eversion - 3-5 x daily - 7 x weekly - 1-2 sets - 30 seconds hold  Seated Toe Curl - 3-5 x daily - 7 x weekly - 3 sets - 10 reps  Towel Scrunches - 2 x daily - 7 x weekly - 3 sets - 10 reps  Toe Spreading - 3-5 x daily - 7 x weekly - 3 sets - 10 reps  Supine Ankle Inversion Eversion AROM - 3-5 x daily - 7 x weekly - 1-2 sets - 10 reps  Supine Ankle Pumps - 3-5 x daily - 7 x weekly - 1-2 sets - 10 reps  Supine Active Straight Leg Raise - 1 x daily - 5 x weekly - 3 sets - 10 reps - 1-2 seconds hold  Sidelying Hip Abduction - 1 x daily - 5 x weekly - 3 sets - 10 reps - 1-2 seconds hold  Prone Hip Extension with Bent Knee - 1 x daily - 5 x weekly - 3 sets - 10 reps - 1-2 seconds hold  Ice - 2 x daily - 7 x weekly - 15-20 minutes duration      ---------------------------    Access Code: [facility]  URL: [email or web address]  Date: 11/01/2022  Prepared by: Deandre Rojas DPT    Exercises  Long Sitting Calf Stretch with Strap - 3-5 x daily - 7 x weekly - 1-2 sets - 30 seconds hold  Long Sitting Ankle PROM Inversion Eversion - 3-5 x daily - 7 x weekly - 1-2 sets - 30 seconds hold  Seated Toe Curl - 3-5 x daily - 7 x weekly - 3 sets - 10 reps  Toe Spreading - 3-5 x daily - 7 x weekly - 3 sets - 10 reps  Supine Ankle Inversion Eversion AROM - 3-5 x daily - 7 x weekly - 1-2 sets - 10 reps  Supine Ankle Pumps - 3-5 x daily - 7 x weekly - 1-2 sets - 10 reps  Ice - 2 x daily - 7 x weekly - 15-20 minutes duration      [employer]

## 2022-12-06 ENCOUNTER — OFFICE VISIT (OUTPATIENT)
Dept: ORTHOPEDIC SURGERY | Age: 27
End: 2022-12-06
Payer: COMMERCIAL

## 2022-12-06 DIAGNOSIS — R26.2 DIFFICULTY IN WALKING: ICD-10-CM

## 2022-12-06 DIAGNOSIS — R53.1 WEAKNESS GENERALIZED: ICD-10-CM

## 2022-12-06 DIAGNOSIS — M79.671 PAIN OF RIGHT HEEL: Primary | ICD-10-CM

## 2022-12-06 DIAGNOSIS — Z74.09 DECREASED FUNCTIONAL MOBILITY AND ENDURANCE: ICD-10-CM

## 2022-12-06 PROCEDURE — 97530 THERAPEUTIC ACTIVITIES: CPT | Performed by: PHYSICAL THERAPIST

## 2022-12-06 PROCEDURE — 97110 THERAPEUTIC EXERCISES: CPT | Performed by: PHYSICAL THERAPIST

## 2022-12-06 PROCEDURE — 97014 ELECTRIC STIMULATION THERAPY: CPT | Performed by: PHYSICAL THERAPIST

## 2022-12-06 NOTE — PROGRESS NOTES
[facility] [address] [address]  Dept: [phone]      Physical Therapy Daily Note     Referring MD: Jan Mcginnis MD  Diagnosis:     ICD-10-CM    1. Pain of right heel  M79.671       2. Decreased functional mobility and endurance  Z74.09       3. Difficulty in walking  R26.2       4. Weakness generalized  R53.1                  Therapy precautions:None  Co-morbidities affecting plan of care: H/o right hip injury requiring crutches and resultant altered gait pattern for about 1 month    Total Timed Procedure Codes: 48 min, Total Time: 63 min  Time In: 01:00 PM  Time Out: 02:03 PM    PERTINENT MEDICAL HISTORY     Past medical and surgical history:   No past medical history on file. No past surgical history on file. Medications: reviewed in chart   Allergies: No Known Allergies       Chief complaints/history of injury:  Patient is a 28 y.o. female with a PMH complicated as noted above. She presents to PT with c/o right lateral heel pain. Date symptoms began: Early April 2022  Shannon Lyons of condition: Chronic (continuous duration > 3 months)  Primary cause of current episode: Unspecified  How did symptoms start: Insidious onset; denies new activities, repeated activities, or held positions   Describe current symptoms: Patient reports lateral heel pain when weightbearing through her RLE. Received previous therapy? No has failed injection, medication, pads and limited activity    Diagnostic exams (per chart review): per 10/5/2022 MD note  Right MRI scan plantar fasciitis, central and lateral band partial tears    SUBJECTIVE   Patient reports continued reduction in foot pain following injection therapy. OBJECTIVE     Treatment provided today consisted of: Therapeutic exercise (24225) x 20s min to develop ROM, strength, endurance and flexibility in the RLE.   NuStep x 6', L4  Slantboard Gastroc Stretch - 3x30\"  Slantboard soleus stretch - 3x30\"  Standing 3-way hip green R-loop x 30    Therapeutic activities (79194) x 28 min using dynamic activities to improve function related to balance on uneven terrain. SLS/Firm/EO x 30\" each  Static  Lateral target tracking  Vertical target   Balance board stabilization; Ant/Post, Med/Lat, Diagonal displacement x 1' each  Forward step-ups on [id number] - 3x10  Lateral step-ups on [id number] - 3x10      Unattended electrical stimulation (18106/) with cryotherapy x 15 minutes to right plantar heel in order to reduce pain and muscle spasms associated with increased activities in therapy as well as help reduce delayed pain episodes. ASSESSMENT   Patient expressed muscular fatigue however no exacerbation of concordant symptoms during treatment today. Demonstration, tactile cuing, and verbal cuing provided to ensure proper performance of exercises. Required frequent use of BUEs to prevent LOB during balance/proprioception challenges. Patient continues to present to therapy in slides. GOALS     Short term goals to be met by 2022  (4 weeks)  Patient will show good recall of their HEP requiring no more than minimal verbal cuing for proper form and technique. - NOT MET: 22    Increase AROM of involved ankle to be ? DF: 10°, PF: 45°, INV: 30°, EV: 10° to improve ability to walk with more normalized gait. - PROGRESSIN22 - missed in DF    Patient will report pain no greater than 6/10 to improve standing tolerance for ADLs and IADLs. - NOT MET: 22   Pt. will demonstrate standing posture with equal weight bearing through BLEs - PROGRESSIN22   Improve LEFS to ? 45/80. - NOT MET: 22    - MET: 22     Long term goals to be met by 2022  (8 weeks)  Increase AROM of involved ankle to be ? DF: 15°, INV: 35°, EV: 15° to improve ability to walk with more normalized gait.    Patient will report pain no greater than 3/10 to improve standing and walking tolerance for work duties. Pt. will improve SLS on firm surface to ? 10 seconds to be able to navigate over obstacles. Patient will achieve an average score on the Patient-Specific Functional Scale ? 7/10 indicating improving functional mobility. Improve LEFS to ? 55/80 allowing for improved Functional Deficit of 69% (Mobility). Discharged from PT     PLAN    Assess hip strength, resume ankle strengthening, and strengthening of foot intrinsics; resume iontophoresis    Access Code: [facility]  URL: [email or web address]  Date: 11/16/2022  Prepared by: Soni Maloney DPT    Exercises  Long Sitting Calf Stretch with Strap - 5 x daily - 7 x weekly - 3 sets - 30 seconds hold  Long Sitting Ankle PROM Inversion Eversion - 5 x daily - 7 x weekly - 3 sets - 30 seconds hold  Seated Great Toe Extension - 2 x daily - 7 x weekly - 3 sets - 10 reps - 10 seconds hold  Seated Lesser Toes Extension - 2 x daily - 7 x weekly - 3 sets - 10 reps - 10 seconds hold  Long Sitting Ankle Dorsiflexion with Anchored Resistance - 2 x daily - 7 x weekly - 1 sets - 30 reps - 4 seconds negative  Sitting Knee Extension with Resistance - 2 x daily - 7 x weekly - 1 sets - 30 reps - 4 seconds negative  Clam with Resistance - 2 x daily - 7 x weekly - 1 sets - 30 reps - 4 second negative  Ice - 2 x daily - 7 x weekly - 15-20 minutes duration    Patient Education  [employer]    -------------------------    Access Code: [facility]  URL: [email or web address]  Date: 11/09/2022  Prepared by: Soni Maloney DPT    Exercises  Long Sitting Calf Stretch with Strap - 3-5 x daily - 7 x weekly - 1-2 sets - 30 seconds hold  Long Sitting Ankle PROM Inversion Eversion - 3-5 x daily - 7 x weekly - 1-2 sets - 30 seconds hold  Seated Toe Curl - 3-5 x daily - 7 x weekly - 3 sets - 10 reps  Towel Scrunches - 2 x daily - 7 x weekly - 3 sets - 10 reps  Toe Spreading - 3-5 x daily - 7 x weekly - 3 sets - 10 reps  Supine Ankle Inversion Eversion AROM - 3-5 x daily - 7 x weekly - 1-2 sets - 10 reps  Supine Ankle Pumps - 3-5 x daily - 7 x weekly - 1-2 sets - 10 reps  Supine Active Straight Leg Raise - 1 x daily - 5 x weekly - 3 sets - 10 reps - 1-2 seconds hold  Sidelying Hip Abduction - 1 x daily - 5 x weekly - 3 sets - 10 reps - 1-2 seconds hold  Prone Hip Extension with Bent Knee - 1 x daily - 5 x weekly - 3 sets - 10 reps - 1-2 seconds hold  Ice - 2 x daily - 7 x weekly - 15-20 minutes duration      ---------------------------    Access Code: [facility]  URL: [email or web address]  Date: 11/01/2022  Prepared by: Davis Chváez DPT    Exercises  Long Sitting Calf Stretch with Strap - 3-5 x daily - 7 x weekly - 1-2 sets - 30 seconds hold  Long Sitting Ankle PROM Inversion Eversion - 3-5 x daily - 7 x weekly - 1-2 sets - 30 seconds hold  Seated Toe Curl - 3-5 x daily - 7 x weekly - 3 sets - 10 reps  Toe Spreading - 3-5 x daily - 7 x weekly - 3 sets - 10 reps  Supine Ankle Inversion Eversion AROM - 3-5 x daily - 7 x weekly - 1-2 sets - 10 reps  Supine Ankle Pumps - 3-5 x daily - 7 x weekly - 1-2 sets - 10 reps  Ice - 2 x daily - 7 x weekly - 15-20 minutes duration      [place]asper

## 2022-12-08 ENCOUNTER — OFFICE VISIT (OUTPATIENT)
Dept: ORTHOPEDIC SURGERY | Age: 27
End: 2022-12-08

## 2022-12-08 DIAGNOSIS — R53.1 WEAKNESS GENERALIZED: ICD-10-CM

## 2022-12-08 DIAGNOSIS — R26.2 DIFFICULTY IN WALKING: ICD-10-CM

## 2022-12-08 DIAGNOSIS — Z74.09 DECREASED FUNCTIONAL MOBILITY AND ENDURANCE: ICD-10-CM

## 2022-12-08 DIAGNOSIS — M79.671 PAIN OF RIGHT HEEL: Primary | ICD-10-CM

## 2022-12-08 NOTE — PROGRESS NOTES
[facility]  [address] [place] [id number]  Dept: [phone]      Physical Therapy Daily Note     Referring MD: Hanane Luna MD  Diagnosis:     ICD-10-CM    1. Pain of right heel  M79.671       2. Decreased functional mobility and endurance  Z74.09       3. Difficulty in walking  R26.2       4. Weakness generalized  R53.1              Therapy precautions:None  Co-morbidities affecting plan of care: H/o right hip injury requiring crutches and resultant altered gait pattern for about 1 month    Total Timed Procedure Codes: 45 min, Total Time: 87 min  Time In: 01:00 PM  Time Out: 2:27 PM    PERTINENT MEDICAL HISTORY     Past medical and surgical history:   No past medical history on file. No past surgical history on file. Medications: reviewed in chart   Allergies: No Known Allergies       Chief complaints/history of injury:  Patient is a 28 y.o. female with a PMH complicated as noted above. She presents to PT with c/o right lateral heel pain. Date symptoms began: Early April 2022  Angela  of condition: Chronic (continuous duration > 3 months)  Primary cause of current episode: Unspecified  How did symptoms start: Insidious onset; denies new activities, repeated activities, or held positions   Describe current symptoms: Patient reports lateral heel pain when weightbearing through her RLE. Received previous therapy? No has failed injection, medication, pads and limited activity    Diagnostic exams (per chart review): per 10/5/2022 MD note  Right MRI scan plantar fasciitis, central and lateral band partial tears    SUBJECTIVE   Patient reports continued reduction in foot pain following injection therapy. States her shoes are due in this Saturday.       OBJECTIVE   Findings[de-identified]  Strength: RIGHT LEFT Quality of Testing   Hip Flex: 4+/5 5/5    Hip Abd: 4/5 4+/5    Hip Ext: 4+/5 4+/5      Treatment provided today consisted of:  Manual therapy (77869) x 15 min utilizing techniques to improve joint and/or soft tissue mobility, ROM, and function as well as helping to decrease pain/spasms and swelling. Palpation and assessment of soft tissue, muscles, and landmarks   STM to plantar right foot and gastrocnemius    Therapeutic exercise (57023) x 30 min to develop ROM, strength, endurance and flexibility in the RLE. Long sitting Calf stretch  Long sitting hamstring stretch  Reverse Bridging, MEDIUM band x 30  Squatting (10) with side steps (10'), MEDIUM band       Iontophoresis (00940) x 20 min to deliver medication to plantar lateral right heel. Dosage: 40 mA.min, Medication: Ketoprofen. ASSESSMENT   Patient tolerated treatment without exacerbation of concordant symptoms. Required repeated verbal cuing to stay on task. Iontophoresis treatment completed however with gradual increase in intensity which resulted in extended treatment time. GOALS     Short term goals to be met by 2022  (4 weeks)  Patient will show good recall of their HEP requiring no more than minimal verbal cuing for proper form and technique. - NOT MET: 22    Increase AROM of involved ankle to be ? DF: 10°, PF: 45°, INV: 30°, EV: 10° to improve ability to walk with more normalized gait. - PROGRESSIN22 - missed in DF    Patient will report pain no greater than 6/10 to improve standing tolerance for ADLs and IADLs. - NOT MET: 22   Pt. will demonstrate standing posture with equal weight bearing through BLEs - PROGRESSIN22   Improve LEFS to ? 45/80. - NOT MET: 22    - MET: 22     Long term goals to be met by 2022  (8 weeks)  Increase AROM of involved ankle to be ? DF: 15°, INV: 35°, EV: 15° to improve ability to walk with more normalized gait. Patient will report pain no greater than 3/10 to improve standing and walking tolerance for work duties.   Pt. will improve SLS on firm surface to ? 10 seconds to be able to navigate over obstacles. Patient will achieve an average score on the Patient-Specific Functional Scale ? 7/10 indicating improving functional mobility. Improve LEFS to ? 55/80 allowing for improved Functional Deficit of 69% (Mobility). Discharged from PT     PLAN    Assess patient's tolerance to treatment and continue with hip and intrinsic foot strengthening as indicated. Access Code: [facility]  URL: [email or web address]  Date: 11/16/2022  Prepared by: Taqueria Garcia DPT    Exercises  Long Sitting Calf Stretch with Strap - 5 x daily - 7 x weekly - 3 sets - 30 seconds hold  Long Sitting Ankle PROM Inversion Eversion - 5 x daily - 7 x weekly - 3 sets - 30 seconds hold  Seated Great Toe Extension - 2 x daily - 7 x weekly - 3 sets - 10 reps - 10 seconds hold  Seated Lesser Toes Extension - 2 x daily - 7 x weekly - 3 sets - 10 reps - 10 seconds hold  Long Sitting Ankle Dorsiflexion with Anchored Resistance - 2 x daily - 7 x weekly - 1 sets - 30 reps - 4 seconds negative  Sitting Knee Extension with Resistance - 2 x daily - 7 x weekly - 1 sets - 30 reps - 4 seconds negative  Clam with Resistance - 2 x daily - 7 x weekly - 1 sets - 30 reps - 4 second negative  Ice - 2 x daily - 7 x weekly - 15-20 minutes duration    Patient Education  [employer]    -------------------------    Access Code: [facility]  URL: [email or web address]  Date: 11/09/2022  Prepared by: Taqueria Garcia DPT    Exercises  Long Sitting Calf Stretch with Strap - 3-5 x daily - 7 x weekly - 1-2 sets - 30 seconds hold  Long Sitting Ankle PROM Inversion Eversion - 3-5 x daily - 7 x weekly - 1-2 sets - 30 seconds hold  Seated Toe Curl - 3-5 x daily - 7 x weekly - 3 sets - 10 reps  Towel Scrunches - 2 x daily - 7 x weekly - 3 sets - 10 reps  Toe Spreading - 3-5 x daily - 7 x weekly - 3 sets - 10 reps  Supine Ankle Inversion Eversion AROM - 3-5 x daily - 7 x weekly - 1-2 sets - 10 reps  Supine Ankle Pumps - 3-5 x daily - 7 x weekly - 1-2 sets - 10 reps  Supine Active Straight Leg Raise - 1 x daily - 5 x weekly - 3 sets - 10 reps - 1-2 seconds hold  Sidelying Hip Abduction - 1 x daily - 5 x weekly - 3 sets - 10 reps - 1-2 seconds hold  Prone Hip Extension with Bent Knee - 1 x daily - 5 x weekly - 3 sets - 10 reps - 1-2 seconds hold  Ice - 2 x daily - 7 x weekly - 15-20 minutes duration      ---------------------------    Access Code: [facility]  URL: [email or web address]  Date: 11/01/2022  Prepared by: Thang King DPT    Exercises  Long Sitting Calf Stretch with Strap - 3-5 x daily - 7 x weekly - 1-2 sets - 30 seconds hold  Long Sitting Ankle PROM Inversion Eversion - 3-5 x daily - 7 x weekly - 1-2 sets - 30 seconds hold  Seated Toe Curl - 3-5 x daily - 7 x weekly - 3 sets - 10 reps  Toe Spreading - 3-5 x daily - 7 x weekly - 3 sets - 10 reps  Supine Ankle Inversion Eversion AROM - 3-5 x daily - 7 x weekly - 1-2 sets - 10 reps  Supine Ankle Pumps - 3-5 x daily - 7 x weekly - 1-2 sets - 10 reps  Ice - 2 x daily - 7 x weekly - 15-20 minutes duration      [employer]

## 2022-12-13 ENCOUNTER — OFFICE VISIT (OUTPATIENT)
Dept: ORTHOPEDIC SURGERY | Age: 27
End: 2022-12-13
Payer: COMMERCIAL

## 2022-12-13 DIAGNOSIS — R53.1 WEAKNESS GENERALIZED: ICD-10-CM

## 2022-12-13 DIAGNOSIS — R26.2 DIFFICULTY IN WALKING: ICD-10-CM

## 2022-12-13 DIAGNOSIS — Z74.09 DECREASED FUNCTIONAL MOBILITY AND ENDURANCE: ICD-10-CM

## 2022-12-13 DIAGNOSIS — M79.671 PAIN OF RIGHT HEEL: Primary | ICD-10-CM

## 2022-12-13 PROCEDURE — 97530 THERAPEUTIC ACTIVITIES: CPT | Performed by: PHYSICAL THERAPIST

## 2022-12-13 PROCEDURE — 97110 THERAPEUTIC EXERCISES: CPT | Performed by: PHYSICAL THERAPIST

## 2022-12-13 NOTE — PROGRESS NOTES
[facility] [address] [address]  Dept: [phone]      Physical Therapy Daily Note     Referring MD: Sarah Borja MD  Diagnosis:     ICD-10-CM    1. Pain of right heel  M79.671       2. Decreased functional mobility and endurance  Z74.09       3. Difficulty in walking  R26.2       4. Weakness generalized  R53.1              Therapy precautions:None  Co-morbidities affecting plan of care: H/o right hip injury requiring crutches and resultant altered gait pattern for about 1 month    Total Timed Procedure Codes: 45 min, Total Time: 45 min  Time In: 02:30 PM  Time Out: 03:15 PM    PERTINENT MEDICAL HISTORY     Past medical and surgical history:   No past medical history on file. No past surgical history on file. Medications: reviewed in chart   Allergies: No Known Allergies       Chief complaints/history of injury:  Patient is a 28 y.o. female with a PMH complicated as noted above. She presents to PT with c/o right lateral heel pain. Date symptoms began: Early April 2022  Luis F Memory of condition: Chronic (continuous duration > 3 months)  Primary cause of current episode: Unspecified  How did symptoms start: Insidious onset; denies new activities, repeated activities, or held positions   Describe current symptoms: Patient reports lateral heel pain when weightbearing through her RLE. Received previous therapy? No has failed injection, medication, pads and limited activity    Diagnostic exams (per chart review): per 10/5/2022 MD note  Right MRI scan plantar fasciitis, central and lateral band partial tears    SUBJECTIVE   Patient reports her new shoes have come in and they feel \"amazing\". States she wore them while at work and felt good pain management. Reports good muscle soreness in her hips following her last therapy session. OBJECTIVE       Treatment provided today consisted of:     Therapeutic exercise (54113) x 30 min to bosu ball, Standing [facility]    Access Code: [facility]  URL: [email or web address]  Date: 11/16/2022  Prepared by: Jessy Colby DPT    Exercises  Long Sitting Calf Stretch with Strap - 5 x daily - 7 x weekly - 3 sets - 30 seconds hold  Long Sitting Ankle PROM Inversion Eversion - 5 x daily - 7 x weekly - 3 sets - 30 seconds hold  Seated Great Toe Extension - 2 x daily - 7 x weekly - 3 sets - 10 reps - 10 seconds hold  Seated Lesser Toes Extension - 2 x daily - 7 x weekly - 3 sets - 10 reps - 10 seconds hold  Long Sitting Ankle Dorsiflexion with Anchored Resistance - 2 x daily - 7 x weekly - 1 sets - 30 reps - 4 seconds negative  Sitting Knee Extension with Resistance - 2 x daily - 7 x weekly - 1 sets - 30 reps - 4 seconds negative  Clam with Resistance - 2 x daily - 7 x weekly - 1 sets - 30 reps - 4 second negative  Ice - 2 x daily - 7 x weekly - 15-20 minutes duration    Patient Education  [employer]    -------------------------    Access Code: [facility]  URL: [email or web address]  Date: 11/09/2022  Prepared by: Jessy Colby DPT    Exercises  Long Sitting Calf Stretch with Strap - 3-5 x daily - 7 x weekly - 1-2 sets - 30 seconds hold  Long Sitting Ankle PROM Inversion Eversion - 3-5 x daily - 7 x weekly - 1-2 sets - 30 seconds hold  Seated Toe Curl - 3-5 x daily - 7 x weekly - 3 sets - 10 reps  Towel Scrunches - 2 x daily - 7 x weekly - 3 sets - 10 reps  Toe Spreading - 3-5 x daily - 7 x weekly - 3 sets - 10 reps  Supine Ankle Inversion Eversion AROM - 3-5 x daily - 7 x weekly - 1-2 sets - 10 reps  Supine Ankle Pumps - 3-5 x daily - 7 x weekly - 1-2 sets - 10 reps  Supine Active Straight Leg Raise - 1 x daily - 5 x weekly - 3 sets - 10 reps - 1-2 seconds hold  Sidelying Hip Abduction - 1 x daily - 5 x weekly - 3 sets - 10 reps - 1-2 seconds hold  Prone Hip Extension with Bent Knee - 1 x daily - 5 x weekly - 3 sets - 10 reps - 1-2 seconds hold  Ice - 2 x daily - 7 x weekly - 15-20 minutes duration      ---------------------------    Access Code: [facility]  URL: [email or web address]  Date: 11/01/2022  Prepared by: Ayo Soler DPT    Exercises  Long Sitting Calf Stretch with Strap - 3-5 x daily - 7 x weekly - 1-2 sets - 30 seconds hold  Long Sitting Ankle PROM Inversion Eversion - 3-5 x daily - 7 x weekly - 1-2 sets - 30 seconds hold  Seated Toe Curl - 3-5 x daily - 7 x weekly - 3 sets - 10 reps  Toe Spreading - 3-5 x daily - 7 x weekly - 3 sets - 10 reps  Supine Ankle Inversion Eversion AROM - 3-5 x daily - 7 x weekly - 1-2 sets - 10 reps  Supine Ankle Pumps - 3-5 x daily - 7 x weekly - 1-2 sets - 10 reps  Ice - 2 x daily - 7 x weekly - 15-20 minutes duration      [employer]

## 2022-12-16 ENCOUNTER — OFFICE VISIT (OUTPATIENT)
Dept: ORTHOPEDIC SURGERY | Age: 27
End: 2022-12-16
Payer: COMMERCIAL

## 2022-12-16 DIAGNOSIS — Z74.09 DECREASED FUNCTIONAL MOBILITY AND ENDURANCE: ICD-10-CM

## 2022-12-16 DIAGNOSIS — M79.671 PAIN OF RIGHT HEEL: Primary | ICD-10-CM

## 2022-12-16 DIAGNOSIS — R53.1 WEAKNESS GENERALIZED: ICD-10-CM

## 2022-12-16 DIAGNOSIS — R26.2 DIFFICULTY IN WALKING: ICD-10-CM

## 2022-12-16 PROCEDURE — 97110 THERAPEUTIC EXERCISES: CPT | Performed by: PHYSICAL THERAPIST

## 2022-12-16 PROCEDURE — 97530 THERAPEUTIC ACTIVITIES: CPT | Performed by: PHYSICAL THERAPIST

## 2022-12-16 NOTE — PROGRESS NOTES
[facility] [address] [address]  Dept: [phone]      Physical Therapy Daily Note     Referring MD: Santiago Nino MD  Diagnosis:     ICD-10-CM    1. Pain of right heel  M79.671       2. Decreased functional mobility and endurance  Z74.09       3. Difficulty in walking  R26.2       4. Weakness generalized  R53.1                Therapy precautions:None  Co-morbidities affecting plan of care: H/o right hip injury requiring crutches and resultant altered gait pattern for about 1 month    Total Timed Procedure Codes: 49 min, Total Time: 49 min  Time In: 02:33 PM  Time Out: 03:22 PM    PERTINENT MEDICAL HISTORY     Past medical and surgical history:   No past medical history on file. No past surgical history on file. Medications: reviewed in chart   Allergies: No Known Allergies       Chief complaints/history of injury:  Patient is a 28 y.o. female with a PMH complicated as noted above. She presents to PT with c/o right lateral heel pain. Date symptoms began: Early April 2022  Jenaro Bird of condition: Chronic (continuous duration > 3 months)  Primary cause of current episode: Unspecified  How did symptoms start: Insidious onset; denies new activities, repeated activities, or held positions   Describe current symptoms: Patient reports lateral heel pain when weightbearing through her RLE. Received previous therapy? No has failed injection, medication, pads and limited activity    Diagnostic exams (per chart review): per 10/5/2022 MD note  Right MRI scan plantar fasciitis, central and lateral band partial tears    SUBJECTIVE   Patient reports no complaints at this time. States her foot continues to tolerate work well while wearing her new shoes. OBJECTIVE     Treatment provided today consisted of: Therapeutic exercise (15093) x 31 min to develop ROM, strength, endurance and flexibility in the RLE.   Treadmill @1.5-1.7 mph, 0% incline x 6'  Slantboard Gastroc Stretch - 3x30\"  Slantboard Soleus Stretch - 3x30\"  4-way ankle TB, black TB x 45 each  4-way ankle on bosu ball - 2x15    Therapeutic activities (18977) x 18 min using dynamic activities to improve function related to MRADLs on uneven or compliant terrain. Unilateral deadlift with BUE support on wall - 2x15  Squatting with forefeet on slantboard  Standing lumbar flexion with forefeet on 1/2 foam x 10        ASSESSMENT   Patient expressed muscular fatigue however no exacerbation of concordant symptoms during treatment today. She required UE support to maintain balance on compliant surfaces, however she showed good hip mobility with forward flexion exercise. GOALS     Short term goals to be met by 2022  (4 weeks)  Patient will show good recall of their HEP requiring no more than minimal verbal cuing for proper form and technique. - NOT MET: 22    Increase AROM of involved ankle to be ? DF: 10°, PF: 45°, INV: 30°, EV: 10° to improve ability to walk with more normalized gait. - PROGRESSIN22 - missed in DF    Patient will report pain no greater than 6/10 to improve standing tolerance for ADLs and IADLs. - NOT MET: 22   Pt. will demonstrate standing posture with equal weight bearing through BLEs - PROGRESSIN22   Improve LEFS to ? 45/80. - NOT MET: 22    - MET: 22     Long term goals to be met by 2022  (8 weeks)  Increase AROM of involved ankle to be ? DF: 15°, INV: 35°, EV: 15° to improve ability to walk with more normalized gait. Patient will report pain no greater than 3/10 to improve standing and walking tolerance for work duties. Pt. will improve SLS on firm surface to ? 10 seconds to be able to navigate over obstacles. Patient will achieve an average score on the Patient-Specific Functional Scale ? 7/10 indicating improving functional mobility.   Improve LEFS to ? 55/80 allowing for improved Functional Deficit of 69% (Mobility). Discharged from PT     PLAN    Re-eval    Access Code: Negr[address]all 49: [email or web address]  Date: 11/16/2022  Prepared by: Deandre Rojas DPT    Exercises  Long Sitting Calf Stretch with Strap - 5 x daily - 7 x weekly - 3 sets - 30 seconds hold  Long Sitting Ankle PROM Inversion Eversion - 5 x daily - 7 x weekly - 3 sets - 30 seconds hold  Seated Great Toe Extension - 2 x daily - 7 x weekly - 3 sets - 10 reps - 10 seconds hold  Seated Lesser Toes Extension - 2 x daily - 7 x weekly - 3 sets - 10 reps - 10 seconds hold  Long Sitting Ankle Dorsiflexion with Anchored Resistance - 2 x daily - 7 x weekly - 1 sets - 30 reps - 4 seconds negative  Sitting Knee Extension with Resistance - 2 x daily - 7 x weekly - 1 sets - 30 reps - 4 seconds negative  Clam with Resistance - 2 x daily - 7 x weekly - 1 sets - 30 reps - 4 second negative  Ice - 2 x daily - 7 x weekly - 15-20 minutes duration    Patient Education  [employer]    -------------------------    Access Code: [facility]  URL: [email or web address]  Date: 11/09/2022  Prepared by: Deandre Rojas DPT    Exercises  Long Sitting Calf Stretch with Strap - 3-5 x daily - 7 x weekly - 1-2 sets - 30 seconds hold  Long Sitting Ankle PROM Inversion Eversion - 3-5 x daily - 7 x weekly - 1-2 sets - 30 seconds hold  Seated Toe Curl - 3-5 x daily - 7 x weekly - 3 sets - 10 reps  Towel Scrunches - 2 x daily - 7 x weekly - 3 sets - 10 reps  Toe Spreading - 3-5 x daily - 7 x weekly - 3 sets - 10 reps  Supine Ankle Inversion Eversion AROM - 3-5 x daily - 7 x weekly - 1-2 sets - 10 reps  Supine Ankle Pumps - 3-5 x daily - 7 x weekly - 1-2 sets - 10 reps  Supine Active Straight Leg Raise - 1 x daily - 5 x weekly - 3 sets - 10 reps - 1-2 seconds hold  Sidelying Hip Abduction - 1 x daily - 5 x weekly - 3 sets - 10 reps - 1-2 seconds hold  Prone Hip Extension with Bent Knee - 1 x daily - 5 x weekly - 3 sets - 10 reps - 1-2 seconds hold  Ice - 2 x daily - 7 x weekly - 15-20 minutes duration      ---------------------------    Access Code: [facility]  URL: [email or web address]  Date: 11/01/2022  Prepared by: Thang King DPT    Exercises  Long Sitting Calf Stretch with Strap - 3-5 x daily - 7 x weekly - 1-2 sets - 30 seconds hold  Long Sitting Ankle PROM Inversion Eversion - 3-5 x daily - 7 x weekly - 1-2 sets - 30 seconds hold  Seated Toe Curl - 3-5 x daily - 7 x weekly - 3 sets - 10 reps  Toe Spreading - 3-5 x daily - 7 x weekly - 3 sets - 10 reps  Supine Ankle Inversion Eversion AROM - 3-5 x daily - 7 x weekly - 1-2 sets - 10 reps  Supine Ankle Pumps - 3-5 x daily - 7 x weekly - 1-2 sets - 10 reps  Ice - 2 x daily - 7 x weekly - 15-20 minutes duration      [employer]

## 2023-01-06 RX ORDER — MELOXICAM 15 MG/1
TABLET ORAL
Qty: 30 TABLET | Refills: 0 | OUTPATIENT
Start: 2023-01-06

## 2023-12-13 NOTE — ED NOTES
Bed: 10  Expected date:   Expected time:   Means of arrival:   Comments:  Triage greg    I have reviewed discharge instructions with the patient. The patient verbalized understanding. Patient left ED via Discharge Method: ambulatory to Home with self. Opportunity for questions and clarification provided. Patient given 1 scripts. To continue your aftercare when you leave the hospital, you may receive an automated call from our care team to check in on how you are doing. This is a free service and part of our promise to provide the best care and service to meet your aftercare needs.  If you have questions, or wish to unsubscribe from this service please call [phone]. Thank you for Choosing our [facility] Emergency Department.